# Patient Record
Sex: MALE | Race: WHITE | NOT HISPANIC OR LATINO | Employment: FULL TIME | URBAN - METROPOLITAN AREA
[De-identification: names, ages, dates, MRNs, and addresses within clinical notes are randomized per-mention and may not be internally consistent; named-entity substitution may affect disease eponyms.]

---

## 2017-02-13 ENCOUNTER — RECORDS - HEALTHEAST (OUTPATIENT)
Dept: LAB | Facility: CLINIC | Age: 62
End: 2017-02-13

## 2017-02-13 LAB
CHOLEST SERPL-MCNC: 174 MG/DL
FASTING STATUS PATIENT QL REPORTED: NO
HCV AB SERPL QL IA: NEGATIVE
HDLC SERPL-MCNC: 55 MG/DL
LDLC SERPL CALC-MCNC: 103 MG/DL
PSA SERPL-MCNC: 1.1 NG/ML (ref 0–4.5)
TRIGL SERPL-MCNC: 78 MG/DL

## 2018-02-20 ENCOUNTER — RECORDS - HEALTHEAST (OUTPATIENT)
Dept: LAB | Facility: CLINIC | Age: 63
End: 2018-02-20

## 2018-02-20 LAB
CHOLEST SERPL-MCNC: 164 MG/DL
FASTING STATUS PATIENT QL REPORTED: NO
HDLC SERPL-MCNC: 56 MG/DL
LDLC SERPL CALC-MCNC: 90 MG/DL
PSA SERPL-MCNC: 1.8 NG/ML (ref 0–4.5)
TRIGL SERPL-MCNC: 92 MG/DL

## 2019-02-22 ENCOUNTER — RECORDS - HEALTHEAST (OUTPATIENT)
Dept: LAB | Facility: CLINIC | Age: 64
End: 2019-02-22

## 2019-02-22 LAB — PSA SERPL-MCNC: 1.1 NG/ML (ref 0–4.5)

## 2023-03-31 ENCOUNTER — HOSPITAL ENCOUNTER (OUTPATIENT)
Dept: MRI IMAGING | Facility: HOSPITAL | Age: 68
Discharge: HOME | End: 2023-03-31
Attending: FAMILY MEDICINE | Admitting: FAMILY MEDICINE
Payer: COMMERCIAL

## 2023-03-31 DIAGNOSIS — M25.561 RIGHT KNEE PAIN: ICD-10-CM

## 2023-03-31 PROCEDURE — 73721 MRI JNT OF LWR EXTRE W/O DYE: CPT | Mod: RT

## 2024-04-30 ENCOUNTER — TRANSFERRED RECORDS (OUTPATIENT)
Dept: HEALTH INFORMATION MANAGEMENT | Facility: CLINIC | Age: 69
End: 2024-04-30

## 2024-08-21 ENCOUNTER — TRANSFERRED RECORDS (OUTPATIENT)
Dept: HEALTH INFORMATION MANAGEMENT | Facility: CLINIC | Age: 69
End: 2024-08-21

## 2024-10-16 ENCOUNTER — HOSPITAL ENCOUNTER (EMERGENCY)
Facility: HOSPITAL | Age: 69
Discharge: HOME | End: 2024-10-16
Admitting: STUDENT IN AN ORGANIZED HEALTH CARE EDUCATION/TRAINING PROGRAM
Payer: COMMERCIAL

## 2024-10-16 ENCOUNTER — APPOINTMENT (OUTPATIENT)
Dept: RADIOLOGY | Facility: HOSPITAL | Age: 69
End: 2024-10-16
Attending: PHYSICIAN ASSISTANT
Payer: COMMERCIAL

## 2024-10-16 VITALS
WEIGHT: 178 LBS | RESPIRATION RATE: 23 BRPM | OXYGEN SATURATION: 97 % | DIASTOLIC BLOOD PRESSURE: 81 MMHG | TEMPERATURE: 98 F | HEART RATE: 94 BPM | SYSTOLIC BLOOD PRESSURE: 133 MMHG

## 2024-10-16 DIAGNOSIS — I48.91 ATRIAL FIBRILLATION WITH RAPID VENTRICULAR RESPONSE (H): ICD-10-CM

## 2024-10-16 LAB
ALBUMIN SERPL BCG-MCNC: 4.5 G/DL (ref 3.5–5.2)
ALP SERPL-CCNC: 143 U/L (ref 40–150)
ALT SERPL W P-5'-P-CCNC: 28 U/L (ref 0–70)
ANION GAP SERPL CALCULATED.3IONS-SCNC: 10 MMOL/L (ref 7–15)
AST SERPL W P-5'-P-CCNC: 30 U/L (ref 0–45)
BASOPHILS # BLD AUTO: 0 10E3/UL (ref 0–0.2)
BASOPHILS NFR BLD AUTO: 0 %
BILIRUB SERPL-MCNC: 0.7 MG/DL
BUN SERPL-MCNC: 5.5 MG/DL (ref 8–23)
CALCIUM SERPL-MCNC: 9.6 MG/DL (ref 8.8–10.4)
CHLORIDE SERPL-SCNC: 106 MMOL/L (ref 98–107)
CREAT SERPL-MCNC: 0.84 MG/DL (ref 0.67–1.17)
EGFRCR SERPLBLD CKD-EPI 2021: >90 ML/MIN/1.73M2
EOSINOPHIL # BLD AUTO: 0.1 10E3/UL (ref 0–0.7)
EOSINOPHIL NFR BLD AUTO: 3 %
ERYTHROCYTE [DISTWIDTH] IN BLOOD BY AUTOMATED COUNT: 12.1 % (ref 10–15)
GLUCOSE SERPL-MCNC: 92 MG/DL (ref 70–99)
HCO3 SERPL-SCNC: 26 MMOL/L (ref 22–29)
HCT VFR BLD AUTO: 49.2 % (ref 40–53)
HGB BLD-MCNC: 16.7 G/DL (ref 13.3–17.7)
IMM GRANULOCYTES # BLD: 0 10E3/UL
IMM GRANULOCYTES NFR BLD: 0 %
LYMPHOCYTES # BLD AUTO: 1.7 10E3/UL (ref 0.8–5.3)
LYMPHOCYTES NFR BLD AUTO: 32 %
MAGNESIUM SERPL-MCNC: 2.3 MG/DL (ref 1.7–2.3)
MCH RBC QN AUTO: 31.9 PG (ref 26.5–33)
MCHC RBC AUTO-ENTMCNC: 33.9 G/DL (ref 31.5–36.5)
MCV RBC AUTO: 94 FL (ref 78–100)
MONOCYTES # BLD AUTO: 0.4 10E3/UL (ref 0–1.3)
MONOCYTES NFR BLD AUTO: 8 %
NEUTROPHILS # BLD AUTO: 2.9 10E3/UL (ref 1.6–8.3)
NEUTROPHILS NFR BLD AUTO: 56 %
NRBC # BLD AUTO: 0 10E3/UL
NRBC BLD AUTO-RTO: 0 /100
NT-PROBNP SERPL-MCNC: 329 PG/ML (ref 0–900)
PLATELET # BLD AUTO: 194 10E3/UL (ref 150–450)
POTASSIUM SERPL-SCNC: 4.8 MMOL/L (ref 3.4–5.3)
PROT SERPL-MCNC: 7.9 G/DL (ref 6.4–8.3)
RBC # BLD AUTO: 5.23 10E6/UL (ref 4.4–5.9)
SODIUM SERPL-SCNC: 142 MMOL/L (ref 135–145)
TROPONIN T SERPL HS-MCNC: 10 NG/L
TROPONIN T SERPL HS-MCNC: 9 NG/L
TSH SERPL DL<=0.005 MIU/L-ACNC: 1.35 UIU/ML (ref 0.3–4.2)
WBC # BLD AUTO: 5.2 10E3/UL (ref 4–11)

## 2024-10-16 PROCEDURE — 84443 ASSAY THYROID STIM HORMONE: CPT | Performed by: PHYSICIAN ASSISTANT

## 2024-10-16 PROCEDURE — 71046 X-RAY EXAM CHEST 2 VIEWS: CPT

## 2024-10-16 PROCEDURE — 82947 ASSAY GLUCOSE BLOOD QUANT: CPT | Performed by: PHYSICIAN ASSISTANT

## 2024-10-16 PROCEDURE — 85025 COMPLETE CBC W/AUTO DIFF WBC: CPT | Performed by: PHYSICIAN ASSISTANT

## 2024-10-16 PROCEDURE — 83735 ASSAY OF MAGNESIUM: CPT | Performed by: PHYSICIAN ASSISTANT

## 2024-10-16 PROCEDURE — 84484 ASSAY OF TROPONIN QUANT: CPT | Performed by: PHYSICIAN ASSISTANT

## 2024-10-16 PROCEDURE — 36415 COLL VENOUS BLD VENIPUNCTURE: CPT | Performed by: PHYSICIAN ASSISTANT

## 2024-10-16 PROCEDURE — 93005 ELECTROCARDIOGRAM TRACING: CPT | Performed by: PHYSICIAN ASSISTANT

## 2024-10-16 PROCEDURE — 99285 EMERGENCY DEPT VISIT HI MDM: CPT | Mod: 25

## 2024-10-16 PROCEDURE — 83880 ASSAY OF NATRIURETIC PEPTIDE: CPT | Performed by: PHYSICIAN ASSISTANT

## 2024-10-16 RX ORDER — METOPROLOL SUCCINATE 25 MG/1
25 TABLET, EXTENDED RELEASE ORAL DAILY
Qty: 30 TABLET | Refills: 0 | Status: SHIPPED | OUTPATIENT
Start: 2024-10-16 | End: 2024-11-15

## 2024-10-16 ASSESSMENT — ACTIVITIES OF DAILY LIVING (ADL)
ADLS_ACUITY_SCORE: 35

## 2024-10-16 ASSESSMENT — COLUMBIA-SUICIDE SEVERITY RATING SCALE - C-SSRS
6. HAVE YOU EVER DONE ANYTHING, STARTED TO DO ANYTHING, OR PREPARED TO DO ANYTHING TO END YOUR LIFE?: NO
1. IN THE PAST MONTH, HAVE YOU WISHED YOU WERE DEAD OR WISHED YOU COULD GO TO SLEEP AND NOT WAKE UP?: NO
2. HAVE YOU ACTUALLY HAD ANY THOUGHTS OF KILLING YOURSELF IN THE PAST MONTH?: NO

## 2024-10-16 NOTE — ED PROVIDER NOTES
EMERGENCY DEPARTMENT ENCOUNTER   NAME: Jayden Maciel ; AGE: 69 year old male ; YOB: 1955 ; MRN: [id number] ; PCP: Roshan Flores     Evaluation Date & Time: No admission date for patient encounter.    ED Provider: Laquita Selby PA-C    CHIEF COMPLAINT     Atrial Fib      FINAL ASSESSMENT       ICD-10-CM    1. Atrial fibrillation with rapid ventricular response (H)  I48.91 [facility]  Referral          ED COURSE, MEDICAL DECISION MAKING, PLAN     ED course     12:46 PM Initial visit with patient and son. Plan for line, labs, imaging.   2:41 PM Rechecked and updated patient. Waiting for delta troponin and xray.   4:16 PM Rechecked and updated patient. Discharge instructions discussed. RN to follow.   ______________________________________________________________________    Reason for visit: Jayden Maciel is a 69 year old male with no pertinent PMH presenting for incidental find of atrial fibrillation today before his colonoscopy.  Reports having some shortness of breath when he tries to run long distance and occasionally gets lightheaded, but otherwise has been feeling great.    Exam positives: Irregularly irregular heart rhythm auscultated.  Scant pitting edema to bilateral ankles.  The rest of the exam is benign.  Heart rate varies from the 70s to the 130s.  He has otherwise been vitally normal.    Labs: Laboratory workup is largely reassuring.     EKG: Atrial fibrillation with RVR with a rate of 107.  Premature ventricular or aberrantly conducted complexes present.  No emergent ischemia or STEMI.    Imaging: Chest x-ray was obtained and independently interpreted by myself.  I am unable to appreciate any acute infiltrate or effusion.  Radiologist read: A strand of fibrosis and/or linear atelectasis in each lung base is stable. Lungs are otherwise clear. No pleural effusion. Heart size and pulmonary vascularity within normal limits     Consults:  /    Interventions/recheck: /    Assessment: Atrial fibrillation with RVR. Rates have been sitting in the low 100s for much of his time here. Pt is asymptomatic. Reports symptoms of shortness of breath and lightheadedness only with running long distances or walking up a steep hill. Not present with normal everyday activities.   [id number] score of 1 point putting him in the low-moderate risk group and anticoagulation should be considered.   Discussed case with Dr. Vegas who felt that starting immediate anticoagulation here was not imperative as long as he sees cardiology in the next 1-2 weeks.   Will start Metoprolol for rate control.   Had a conversation with patient regarding this and he is comfortable with this plan.     Acutely serious/life threatening considerations:   Unstable atrial fibrillation: pt asymptomatic. Rate not severely elevated. No elevated trop.   ACS/carditis: EKG and troponin reassuring to rule out ACS.   Aortic dissection: No chest pain or back pain. No syncope. Vitals reassuring.   CHF: BNP WNL, no effusions on CXR, no significant LE edema.       Overall, no red flag s/s present to suggest an acutely serious or life threatening condition that would necessitate hospitalization.     Plan: Rx for Metoprolol succinate 25mg. Rapid access referral provided.   Strict return precautions reviewed.  Patient/parent/guardian understanding and agreeable with the plan and will discharge to home in good condition.       *All pertinent lab & imaging studies independently reviewed. (See chart for details)   *Discussed the results of all the tests and plan with patient and family/guardians.       HISTORY OF PRESENT ILLNESS   Patient information was obtained from: Patient   Use of Intrepreter: None     Pertinent past medical history: none     Jayden Maciel is here by means of walk in  with family for evaluation of an incidental finding of atrial fibrillation.    Patient was scheduled to have his  colonoscopy today.  Completed his prep and showed up for the appointment, but they found him to be in atrial fibrillation.  They aborted the procedure and told him to come to the ER for evaluation.    Patient tells me that he has had some exertional shortness of breath since August after catching COVID.  States that he really notices it when he tries to run long distance (which he regularly does).  He has noticed that he needs to walk more often during his runs than what he normally does.  He does get a little lightheaded sometimes during these long distance runs as well.    No significant shortness of breath with everyday activities.  No dizziness or lightheadedness with everyday activities.  He has not noticed any significant fatigue.  Denies chest pain or palpitations.  Denies any lower extremity swelling.    Denies a history of atrial fibrillation, other cardiac conditions, or any other medical conditions in general.  Patient tells me that he is very healthy and does not take any daily medications for anything.    No other concerns.      MEDICAL HISTORY     No past medical history on file.    No past surgical history on file.    No family history on file.         metoprolol succinate ER (TOPROL XL) 25 MG 24 hr tablet          PHYSICAL EXAM     First Vitals:  Patient Vitals for the past 24 hrs:   BP Temp Pulse Resp SpO2 Weight   10/16/24 1615 133/81 -- 94 23 97 % --   10/16/24 1600 129/89 -- 98 29 97 % --   10/16/24 1545 124/83 -- 103 17 97 % --   10/16/24 1530 128/74 -- 106 24 97 % --   10/16/24 1516 -- -- -- -- 96 % --   10/16/24 1515 130/88 -- 107 -- 97 % --   10/16/24 1503 -- -- 106 19 97 % --   10/16/24 1500 126/86 -- (!) 133 11 -- --   10/16/24 1147 119/85 98  F (36.7  C) 79 14 98 % 80.7 kg (178 lb)       PHYSICAL EXAM:   Constitutional: No acute distress.  Neuro: Awake and alert. No focal deficits.  Psych: Calm and cooperative.  Eyes: PERRL. EOMI. Conjunctivae clear.    Mouth: Pink and moist.    Neck:  FROM.    Cardio: Irregularly irregular rhythm on auscultation.  Variable heart rate.  Initially 79, but does go as high as 133 at 1 point during the visit.  Adequate perfusion to extremities. No murmurs.  Pulmonary: Oxygenating well on RA. No labored breathing. CTA b/l.   Upper extremities: Moves freely.    Lower extremities: Scant pitting edema to bilateral ankles. Moves freely.   Skin: Natural color, warm, dry, intact.       RESULTS     LAB:  All pertinent labs reviewed and interpreted  Labs Ordered and Resulted from Time of ED Arrival to Time of ED Departure   COMPREHENSIVE METABOLIC PANEL - Abnormal       Result Value    Sodium 142      Potassium 4.8      Carbon Dioxide (CO2) 26      Anion Gap 10      Urea Nitrogen 5.5 (*)     Creatinine 0.84      GFR Estimate >90      Calcium 9.6      Chloride 106      Glucose 92      Alkaline Phosphatase 143      AST 30      ALT 28      Protein Total 7.9      Albumin 4.5      Bilirubin Total 0.7     TROPONIN T, HIGH SENSITIVITY - Normal    Troponin T, High Sensitivity 10     MAGNESIUM - Normal    Magnesium 2.3     TSH WITH FREE T4 REFLEX - Normal    TSH 1.35     NT PROBNP INPATIENT - Normal    N terminal Pro BNP Inpatient 329     TROPONIN T, HIGH SENSITIVITY - Normal    Troponin T, High Sensitivity 9     CBC WITH PLATELETS AND DIFFERENTIAL    WBC Count 5.2      RBC Count 5.23      Hemoglobin 16.7      Hematocrit 49.2      MCV 94      MCH 31.9      MCHC 33.9      RDW 12.1      Platelet Count 194      % Neutrophils 56      % Lymphocytes 32      % Monocytes 8      % Eosinophils 3      % Basophils 0      % Immature Granulocytes 0      NRBCs per 100 WBC 0      Absolute Neutrophils 2.9      Absolute Lymphocytes 1.7      Absolute Monocytes 0.4      Absolute Eosinophils 0.1      Absolute Basophils 0.0      Absolute Immature Granulocytes 0.0      Absolute NRBCs 0.0         RADIOLOGY:  Chest XR,  PA & LAT   Final Result   IMPRESSION: A strand of fibrosis and/or linear atelectasis in  each lung base is stable. Lungs are otherwise clear. No pleural effusion. Heart size and pulmonary vascularity within normal limits.          ECG:  EKG was collected and independently interpreted by myself and also confirmed by MD.  Findings: Atrial fibrillation with rapid ventricular response with premature ventricular or apparently conducted complexes.  Rate of 107.  No interval prolongation.  No emergent ischemia or STEMI.  Comparisons: None      PROCEDURES     None         FINAL IMPRESSION:    ICD-10-CM    1. Atrial fibrillation with rapid ventricular response (H)  I48.91 [facility]  Referral          MEDICATIONS GIVEN IN THE EMERGENCY DEPARTMENT:  Medications - No data to display    NEW PRESCRIPTIONS STARTED AT TODAY'S ED VISIT:  New Prescriptions    METOPROLOL SUCCINATE ER (TOPROL XL) 25 MG 24 HR TABLET    Take 1 tablet (25 mg) by mouth daily.            MEDICAL DECISION MAKING:  Medical Decision Making  Obtained supplemental history:Supplemental history obtained?: No  Reviewed external records: External records reviewed?: No  Care impacted by chronic illness:Documented in Chart  Did you consider but not order tests?: Work up considered but not performed and documented in chart, if applicable  Did you interpret images independently?: Independent interpretation of ECG and images noted in documentation, when applicable.  Consultation discussion with other provider:Did you involve another provider (consultant, , pharmacy, etc.)?: No  Discharge. I prescribed additional prescription strength medication(s) as charted. See documentation for any additional details.    MIPS: Not Applicable      CRITICAL CARE:  N/A           Some or all of this documentation has been completed using dictation software and mild grammatical errors may be present. Please contact me with any concerns regarding this.       Laquita Selby PA-C  Emergency Medicine   [facility] EMERGENCY  DEPARTMENT       Laquita Selby PA-C  10/16/24 [id number]

## 2024-10-16 NOTE — DISCHARGE INSTRUCTIONS
You were seen for Atrial Fibrillation. Your heart rate has also been high because of this irregular rhythm. I am starting you on a medication to help slow your heart rate down some. It is called Metoprolol. Take this once daily.     I have provided you with a referral to see a cardiologist [facility]. You are considered a low-moderate risk of developing a stroke based on your age and past medical history. With this being said, you may benefit from an anticoagulant (aka blood thinner) medication. This will be something you and the cardiologist can discuss as there are both benefits and risks.     For any new or worsening symptoms do not hesitate to return to the ER.

## 2024-10-16 NOTE — ED NOTES
"Expected Patient Referral to ED  11:31 AM    Referring Clinic/Provider:  [facility]    Reason for referral/Clinical facts:  Did colonoscopy prep and then went to appointment, on EKG was found to be in afib, , don't know how long patient was in afib, \"this is new to him so we just wanted him to get checked out\", coming by car.    Recommendations provided:  Send to ED for further evaluation    Caller was informed that this institution does possess the capabilities and/or resources to provide for patient and should be transferred to our facility.    Discussed that if direct admit is sought and any hurdles are encountered, this ED would be happy to see the patient and evaluate.    Informed caller that recommendations provided are recommendations based only on the facts provided and that they responsible to accept or reject the advice, or to seek a formal in person consultation as needed and that this ED will see/treat patient should they arrive.      Mireille Mukherjee MD  [facility] EMERGENCY DEPARTMENT  [address] [place] [id number] [phone]       Mireille Mukherjee MD  10/16/24 1132    "

## 2024-10-16 NOTE — ED TRIAGE NOTES
Pt was in for routine colonoscopy today and per report in afib. No hx of. SOB and fatigue since COVID in August however no CP or feeling of heart racing or palpitations. Not on thinners     Triage Assessment (Adult)       Row Name 10/16/24 1147          Triage Assessment    Airway WDL WDL        Respiratory WDL    Respiratory WDL WDL        Skin Circulation/Temperature WDL    Skin Circulation/Temperature WDL WDL        Cardiac WDL    Cardiac WDL WDL        Peripheral/Neurovascular WDL    Peripheral Neurovascular WDL WDL        Cognitive/Neuro/Behavioral WDL    Cognitive/Neuro/Behavioral WDL WDL

## 2024-10-19 LAB
ATRIAL RATE - MUSE: 67 BPM
DIASTOLIC BLOOD PRESSURE - MUSE: NORMAL MMHG
INTERPRETATION ECG - MUSE: NORMAL
P AXIS - MUSE: NORMAL DEGREES
PR INTERVAL - MUSE: NORMAL MS
QRS DURATION - MUSE: 88 MS
QT - MUSE: 342 MS
QTC - MUSE: 456 MS
R AXIS - MUSE: 58 DEGREES
SYSTOLIC BLOOD PRESSURE - MUSE: NORMAL MMHG
T AXIS - MUSE: 19 DEGREES
VENTRICULAR RATE- MUSE: 107 BPM

## 2024-10-23 ENCOUNTER — OFFICE VISIT (OUTPATIENT)
Dept: CARDIOLOGY | Facility: CLINIC | Age: 69
End: 2024-10-23
Attending: PHYSICIAN ASSISTANT
Payer: COMMERCIAL

## 2024-10-23 VITALS
DIASTOLIC BLOOD PRESSURE: 62 MMHG | WEIGHT: 177 LBS | RESPIRATION RATE: 16 BRPM | HEART RATE: 49 BPM | HEIGHT: 68 IN | SYSTOLIC BLOOD PRESSURE: 114 MMHG | BODY MASS INDEX: 26.83 KG/M2

## 2024-10-23 DIAGNOSIS — I48.19 PERSISTENT ATRIAL FIBRILLATION (H): Primary | ICD-10-CM

## 2024-10-23 DIAGNOSIS — I48.91 ATRIAL FIBRILLATION WITH RAPID VENTRICULAR RESPONSE (H): ICD-10-CM

## 2024-10-23 PROCEDURE — 99205 OFFICE O/P NEW HI 60 MIN: CPT | Performed by: INTERNAL MEDICINE

## 2024-10-23 NOTE — LETTER
10/23/2024    Roshan Flores MD  [facility] [address]  [facility] [facility] [address]    RE: Jayden Maciel       Dear Colleague,     I had the pleasure of seeing Jayden Maciel in the [facility].    CARDIOLOGY CLINIC CONSULT NOTE     Assessment/Plan:   1.  Atrial fibrillation with rapid ventricular response.  Slowed heart rate following initiation with metoprolol, no change in symptoms.  Duration of atrial fibrillation on yet clear, as he has been asymptomatic.  Will plan a trial of cardioversion to see if this improves his stamina which she has noticed to be decreased over the last couple of years.  We will also check an echocardiogram to look for evidence of structural heart disease.  Long-term with his [id number] score of 1 he may not need to be on anticoagulation, discussed that for the next couple of months at least we will continue Eliquis 5 mg twice daily.  Continue metoprolol succinate.     A-fib clinic follow-up in preparation for cardioversion in 3 to 4 weeks, follow-up with me 3 months     History of Present Illness:     It is my pleasure to see Jayden Maciel at the [facility] clinic for evaluation of new onset atrial fibrillation.    Jayden Maciel is a 69 year old male with a past medical history of good health, lifelong runner who contracted COVID-pneumonia in 2022 and noticed a significant decline in his activity tolerance after that time.  He did not check his pulse on any regular basis but resumed walking/jogging, at a slower pace and was able to complete two 5K runs this summer.  He developed a recurrent bout of COVID in August and noticed a decline in his activity tolerance since then.  Last week he was scheduled for screening colonoscopy and after completing the prep he was found to be in atrial fibrillation with a rapid ventricular response.  He was otherwise asymptomatic.  His colonoscopy was canceled, he was sent to the  "emergency room where he was started on metoprolol succinate 25 mg daily.  He did not notice any difference after starting the metoprolol, was able to run another 5K over the weekend with stable activity tolerance.    He has no history of hypertension, diabetes mellitus, stroke, vascular disease, heart failure.  This gives him a [id number] score of 1.  He reports he will have no problems remembering to take pills on a daily basis or even twice a day.    Past Medical History:   There is no problem list on file for this patient.      Past Surgical History:   No past surgical history on file.    Family History:   No family history on file.  Family history reviewed and is not pertinent to the chief complaint or presenting problem    Social History:    reports that he has never smoked. He has never used smokeless tobacco. He reports that he does not use drugs.    Exercise: Active runner/jogger, loss of stamina since 1/2022, still able to walk and jog, competed in 5K race over the weekend and walk 4 miles yesterday without a problem.  Does not track his heart rate    Sleep: Polysomnography in 2015 at a weight of 165 pounds showed mild obstructive sleep apnea.    Meds:     Current Outpatient Medications   Medication Sig Dispense Refill     metoprolol succinate ER (TOPROL XL) 25 MG 24 hr tablet Take 1 tablet (25 mg) by mouth daily. 30 tablet 0       Allergies:   Penicillins and Other environmental allergy    Review of Systems:     Positive for cough, shortness of breath with activity, irregular heartbeat, leg swelling.  12 point review of systems otherwise negative     Please refer above for cardiac ROS details.       Objective:      Physical Exam  80.3 kg (177 lb)  1.727 m (5' 8\")  Body mass index is 26.91 kg/m .  /62 (BP Location: Left arm, Patient Position: Sitting, Cuff Size: Adult Regular)   Pulse (!) 49   Resp 16   Ht 1.727 m (5' 8\")   Wt 80.3 kg (177 lb)   BMI 26.91 kg/m          General Appearance : " "Awake, Alert, No acute distress  HEENT: No Scleral icterus; the mucous membranes were pink and moist.  Conjunctivae not injected  Neck:  No cervical bruits, jugular venous distention, or thyromegaly   Chest: The spine was straight. Chest wall symmetric  Lungs: Respirations unlabored; the lungs are clear to auscultation.  No wheezing   Cardiovascular:   Normal point of maximal impulse.  Auscultation reveals irregular first and second heart sounds with no murmurs, rubs, or gallops.  Carotid, radial, and dorsalis pedal pulses are intact and symmetric.    Abdomen: No organomegaly, masses, bruits, or tenderness. Bowels sounds are present  Extremities: No edema  Skin: No xanthelasma. Warm, Dry.  Musculoskeletal: No tenderness.  Neurologic: Alert and oriented ×3. Speech is fluent.      EKG (personally reviewed):  10/16/2024: Atrial fibrillation with rapid ventricular response and aberrant conduction versus PVCs at 107 bpm.    Cardiac Imaging Studies:  EXAM: XR CHEST 2 VIEWS  LOCATION: [facility]  DATE: 10/16/2024  INDICATION: Atrial fibrillation incidentally found  COMPARISON: 3/27/2023                                                        IMPRESSION: A strand of fibrosis and/or linear atelectasis in each lung base is stable. Lungs are otherwise clear. No pleural effusion. Heart size and pulmonary vascularity within normal limits.     Lab Results    Chemistry/lipid CBC Cardiac Enzymes/BNP/TSH/INR   Recent Labs   Lab Test 02/20/18  0834   CHOL 164   HDL 56   LDL 90   TRIG 92     Recent Labs   Lab Test 02/20/18  0834 02/13/17  0956   LDL 90 103     Recent Labs   Lab Test 10/16/24  1332      POTASSIUM 4.8   CHLORIDE 106   CO2 26   GLC 92   BUN 5.5*   CR 0.84   GFRESTIMATED >90   LAMONTE 9.6     Recent Labs   Lab Test 10/16/24  1332   CR 0.84     No results for input(s): \"A1C\" in the last 69522 hours.       Recent Labs   Lab Test 10/16/24  1332   WBC 5.2   HGB 16.7   HCT 49.2   MCV 94    " "    Recent Labs   Lab Test 10/16/24  1332   HGB 16.7    No results for input(s): \"TROPONINI\" in the last 89377 hours.  Recent Labs   Lab Test 10/16/24  1332   NTBNPI 329     Recent Labs   Lab Test 10/16/24  1332   TSH 1.35     No results for input(s): \"INR\" in the last 45626 hours.       Rio Mobley MD, MD [facility]      This note created using Dragon voice recognition software. Sound alike errors may have escaped editing.        Thank you for allowing me to participate in the care of your patient.      Sincerely,     Rio Mobley MD     [facility]  cc:   Laquita Sleby PA-C  [address] [place] [address]      "

## 2024-10-23 NOTE — PATIENT INSTRUCTIONS
Continue metoprolol succinate 25 mg daily  Continue to stay active, if you are having troubles give us a call and we will need to change your therapy.  Start Eliquis 5 mg twice daily, continue this until you get the clearance to stop it after your cardioversion.  Return in 3 to 4 weeks to make sure you are taking your medications daily and plan cardioversion  We will schedule an echocardiogram to make sure there is no structural heart disease

## 2024-10-23 NOTE — PROGRESS NOTES
CARDIOLOGY CLINIC CONSULT NOTE     Assessment/Plan:   1.  Atrial fibrillation with rapid ventricular response.  Slowed heart rate following initiation with metoprolol, no change in symptoms.  Duration of atrial fibrillation on yet clear, as he has been asymptomatic.  Will plan a trial of cardioversion to see if this improves his stamina which she has noticed to be decreased over the last couple of years.  We will also check an echocardiogram to look for evidence of structural heart disease.  Long-term with his [id number] score of 1 he may not need to be on anticoagulation, discussed that for the next couple of months at least we will continue Eliquis 5 mg twice daily.  Continue metoprolol succinate.     A-fib clinic follow-up in preparation for cardioversion in 3 to 4 weeks, follow-up with me 3 months     History of Present Illness:     It is my pleasure to see Jayden Maciel at the [facility] clinic for evaluation of new onset atrial fibrillation.    Jayden Maciel is a 69 year old male with a past medical history of good health, lifelong runner who contracted COVID-pneumonia in 2022 and noticed a significant decline in his activity tolerance after that time.  He did not check his pulse on any regular basis but resumed walking/jogging, at a slower pace and was able to complete two 5K runs this summer.  He developed a recurrent bout of COVID in August and noticed a decline in his activity tolerance since then.  Last week he was scheduled for screening colonoscopy and after completing the prep he was found to be in atrial fibrillation with a rapid ventricular response.  He was otherwise asymptomatic.  His colonoscopy was canceled, he was sent to the emergency room where he was started on metoprolol succinate 25 mg daily.  He did not notice any difference after starting the metoprolol, was able to run another 5K over the weekend with stable activity tolerance.    He has no history of  "hypertension, diabetes mellitus, stroke, vascular disease, heart failure.  This gives him a QFI0FG2-AUFv score of 1.  He reports he will have no problems remembering to take pills on a daily basis or even twice a day.    Past Medical History:   There is no problem list on file for this patient.      Past Surgical History:   No past surgical history on file.    Family History:   No family history on file.  Family history reviewed and is not pertinent to the chief complaint or presenting problem    Social History:    reports that he has never smoked. He has never used smokeless tobacco. He reports that he does not use drugs.    Exercise: Active runner/jogger, loss of stamina since 1/2022, still able to walk and jog, competed in 5K race over the weekend and walk 4 miles yesterday without a problem.  Does not track his heart rate    Sleep: Polysomnography in 2015 at a weight of 165 pounds showed mild obstructive sleep apnea.    Meds:     Current Outpatient Medications   Medication Sig Dispense Refill    metoprolol succinate ER (TOPROL XL) 25 MG 24 hr tablet Take 1 tablet (25 mg) by mouth daily. 30 tablet 0       Allergies:   Penicillins and Other environmental allergy    Review of Systems:     Positive for cough, shortness of breath with activity, irregular heartbeat, leg swelling.  12 point review of systems otherwise negative     Please refer above for cardiac ROS details.       Objective:      Physical Exam  80.3 kg (177 lb)  1.727 m (5' 8\")  Body mass index is 26.91 kg/m .  /62 (BP Location: Left arm, Patient Position: Sitting, Cuff Size: Adult Regular)   Pulse (!) 49   Resp 16   Ht 1.727 m (5' 8\")   Wt 80.3 kg (177 lb)   BMI 26.91 kg/m          General Appearance : Awake, Alert, No acute distress  HEENT: No Scleral icterus; the mucous membranes were pink and moist.  Conjunctivae not injected  Neck:  No cervical bruits, jugular venous distention, or thyromegaly   Chest: The spine was straight. Chest wall " "symmetric  Lungs: Respirations unlabored; the lungs are clear to auscultation.  No wheezing   Cardiovascular:   Normal point of maximal impulse.  Auscultation reveals irregular first and second heart sounds with no murmurs, rubs, or gallops.  Carotid, radial, and dorsalis pedal pulses are intact and symmetric.    Abdomen: No organomegaly, masses, bruits, or tenderness. Bowels sounds are present  Extremities: No edema  Skin: No xanthelasma. Warm, Dry.  Musculoskeletal: No tenderness.  Neurologic: Alert and oriented ×3. Speech is fluent.      EKG (personally reviewed):  10/16/2024: Atrial fibrillation with rapid ventricular response and aberrant conduction versus PVCs at 107 bpm.    Cardiac Imaging Studies:  EXAM: XR CHEST 2 VIEWS  LOCATION: [facility]  DATE: 10/16/2024  INDICATION: Atrial fibrillation incidentally found  COMPARISON: 3/27/2023                                                        IMPRESSION: A strand of fibrosis and/or linear atelectasis in each lung base is stable. Lungs are otherwise clear. No pleural effusion. Heart size and pulmonary vascularity within normal limits.     Lab Results    Chemistry/lipid CBC Cardiac Enzymes/BNP/TSH/INR   Recent Labs   Lab Test 02/20/18  0834   CHOL 164   HDL 56   LDL 90   TRIG 92     Recent Labs   Lab Test 02/20/18  0834 02/13/17  0956   LDL 90 103     Recent Labs   Lab Test 10/16/24  1332      POTASSIUM 4.8   CHLORIDE 106   CO2 26   GLC 92   BUN 5.5*   CR 0.84   GFRESTIMATED >90   LAMONTE 9.6     Recent Labs   Lab Test 10/16/24  1332   CR 0.84     No results for input(s): \"A1C\" in the last 32368 hours.       Recent Labs   Lab Test 10/16/24  1332   WBC 5.2   HGB 16.7   HCT 49.2   MCV 94        Recent Labs   Lab Test 10/16/24  1332   HGB 16.7    No results for input(s): \"TROPONINI\" in the last 23749 hours.  Recent Labs   Lab Test 10/16/24  1332   NTBNPI 329     Recent Labs   Lab Test 10/16/24  1332   TSH 1.35     No results for " "input(s): \"INR\" in the last 57472 hours.       Rio Mobley MD, MD [facility]      This note created using Dragon voice recognition software. Sound alike errors may have escaped editing.    "

## 2024-11-20 ENCOUNTER — HOSPITAL ENCOUNTER (OUTPATIENT)
Dept: CARDIOLOGY | Facility: HOSPITAL | Age: 69
Discharge: HOME | End: 2024-11-20
Attending: INTERNAL MEDICINE
Payer: COMMERCIAL

## 2024-11-20 DIAGNOSIS — I48.19 PERSISTENT ATRIAL FIBRILLATION (H): ICD-10-CM

## 2024-11-20 DIAGNOSIS — I48.91 ATRIAL FIBRILLATION WITH RAPID VENTRICULAR RESPONSE (H): ICD-10-CM

## 2024-11-20 PROCEDURE — 93306 TTE W/DOPPLER COMPLETE: CPT | Mod: 26 | Performed by: INTERNAL MEDICINE

## 2024-11-20 PROCEDURE — 93306 TTE W/DOPPLER COMPLETE: CPT

## 2024-11-25 NOTE — PROGRESS NOTES
[facility]  Cardiac Electrophysiology  [address]  [place] [address]   Office: [phone]  Fax: [phone]     HEART CARE ELECTROPHYSIOLOGY NOTE    Primary care: Roshan Flores MD  Primary cardiologist: Dr. Mobley    REASON FOR VISIT: persistent atrial fibrillation      Assessment/Recommendations     Persistent atrial fibrillation/stage 3A: he was seen in clinic 10/23/24 by Dr. Mobley for atrial fibrillaiton with RVR. He was scheduled for preop for colonoscopy and was found to be in atrial fibrillation with RVR.  He was asymptomatic. He was on metoprolol and felt better as his exercise tolerance was increased. He was only given a one month supply of metoprolol and ran out last week. Since then, he has noticed his heart rate was higher and exercise tolerance was down. Overall, his decreased exercise tolerance started when he had COVID in 2022.     He does not monitor his pulse at home. His pulse today is irregular and around 100, but also states he was nervous this morning as he thought he was getting cardioversion today.     We discussed the pathophysiology and natural progression of atrial fibrillation, management including stroke risk reduction, rate control, antiarrhythmic drug therapy, and consideration of catheter ablation, with maintenance of sinus rhythm associated with reduced mortality, stroke, and heart failure- and acute coronary syndrome-related hospitalizations. Discussed catheter ablation procedure for atrial fibrillation as a long-term management strategy and superiority to medical therapy . He was given information to review at home. We also discussed long term management of atrial fibrillation includes sleep apnea diagnosis and management, avoiding heavy alcohol consumption, and management of concurrent hypertension and coronary artery disease as appropriate.    [id number] score of 1 for age    Plan:  Resume metoprolol 25 mg daily today - he had  symptomatic improvement. Can hold morning of cardioversion.   Continue eliquis 5 mg BID for stroke prophylaxis, no missed doses in the last 3 weeks, continue morning of cardioversion.  Cardioversion when able. May need antiarrythmic if cardioversion fails.    We discussed ablation as a long-term treatment strategy for atrial fibrillation, he is interested. Message was passed on to our RN team.        History of Present Illness/Subjective    Jayden Maciel is a 69 year old male who is seen today for evaluation of atrial fibrillation.  He was diagnosed incidentally at preop screening for colonoscopy.  He was found to be in A-fib with RVR, asymptomatic at that time.     He has definitely noticed a decrease in his exercise tolerance. While he was on metoprolol, he was able to walk further and at a faster pace. He ran out of metoprolol about a week ago, and has noticed he hasn't been able to walk as far, and has noticed his heart rate has been faster as well. He did note some lightheadedness with his most recent brisk walk in the evening.     At rest, he denies chest pain, SOB, dizziness/lightheadedness, palpitations/racing heart, syncope/pre-syncope, abdominal bloating, lower extremity edema         Data Review     Arrhythmia hx:  Sx: Asymptomatic, possible decreased exercise tolerance  Sx onset: Unclear  Dx/date: 10/16/2024  Rate control: Metoprolol succinate 25 mg  AAD: None  DCCV: None  Ablation: None  [id number] 1 for age  OAC: Eliquis 5 mg twice daily    EKG personally reviewed.   10/16/2024: Atrial fibrillation with  bpm    TTE 11/20/24:  1. The left ventricle is normal in size. Left ventricular systolic performance  is at the lower limits of normal. The ejection fraction is estimated to be  50%.  2. There is mild aortic valve sclerosis without significant stenosis.  3. Normal right ventricular size with mildly reduced right ventricular  systolic performance.  4. There is mild-moderate biatrial  enlargement.      I have reviewed and updated the patient's past medical history, allergy list and medication list.          Physical Examination Review of Systems   BMI= There is no height or weight on file to calculate BMI.    Wt Readings from Last 3 Encounters:   10/23/24 80.3 kg (177 lb)   10/16/24 80.7 kg (178 lb)       Vitals: There were no vitals taken for this visit.  General   Appearance:   Alert and oriented, in no acute distress.    HEENT:  Normocephalic and atraumatic. Conjunctiva and sclera are clear. Moist oral mucosa.    Neck: No JVP, carotid bruit or obvious thyromegaly.   Lungs:   Respirations unlabored. Clear bilaterally with no rales, rhonchi, or wheezes.     Cardiovascular:   Rhythm is irregular. S1 and S2 are normal. No significant murmur is present. Lower extremities demonstrate no significant edema. Posterior tibial pulses are intact bilaterally.   Extremities: No cyanosis or clubbing   Skin: Skin is warm, dry, and otherwise intact.   Neurologic: Gait not assessed. Mood and affect appropriate.     ROS: 10 point ROS neg other than the symptoms noted above in the HPI.      Medical History  Surgical History Family History Social History   No past medical history on file. No past surgical history on file. No family history on file. Social History     Tobacco Use    Smoking status: Never    Smokeless tobacco: Never   Substance Use Topics    Drug use: Never          Medications  Allergies   Current Outpatient Medications   Medication Sig Dispense Refill    apixaban ANTICOAGULANT (ELIQUIS) 5 MG tablet Take 1 tablet (5 mg) by mouth 2 times daily. 90 tablet 3    metoprolol succinate ER (TOPROL XL) 25 MG 24 hr tablet Take 1 tablet (25 mg) by mouth daily. 30 tablet 0    Allergies   Allergen Reactions    Penicillins Hives    Other Environmental Allergy Other (See Comments)     Hayfever Symptoms         Lab Results    Chemistry/lipid CBC Cardiac Enzymes/BNP/TSH/INR   Lab Results   Component Value Date     "BUN 5.5 (L) 10/16/2024     10/16/2024    CO2 26 10/16/2024     No results found for: \"CREATININE\"    Lab Results   Component Value Date    CHOL 164 2018    HDL 56 2018    LDL 90 2018      Lab Results   Component Value Date    WBC 5.2 10/16/2024    HGB 16.7 10/16/2024    HCT 49.2 10/16/2024    MCV 94 10/16/2024     10/16/2024    Lab Results   Component Value Date    TSH 1.35 10/16/2024        The longitudinal plan of care for the diagnosis(es)/condition(s) as documented were addressed during this visit. Due to the added complexity in care, I will continue to support Jayden in the subsequent management and with ongoing continuity of care.      This note has been dictated using voice recognition software. Any grammatical, typographical, or context distortions are unintentional and inherent to the software.    Ishmael Ramirez PA-C  Clinical Cardiac Electrophysiology  [facility]  Clinic and [phone]  Fax: [phone]  Electrophysiology Nurses: [phone]                                  "

## 2024-11-25 NOTE — H&P (VIEW-ONLY)
[facility]  Cardiac Electrophysiology  [address]  [place] [address]   Office: [phone]  Fax: [phone]     HEART CARE ELECTROPHYSIOLOGY NOTE    Primary care: Roshan Flores MD  Primary cardiologist: Dr. Mobley    REASON FOR VISIT: persistent atrial fibrillation      Assessment/Recommendations     Persistent atrial fibrillation/stage 3A: he was seen in clinic 10/23/24 by Dr. Mobley for atrial fibrillaiton with RVR. He was scheduled for preop for colonoscopy and was found to be in atrial fibrillation with RVR.  He was asymptomatic. He was on metoprolol and felt better as his exercise tolerance was increased. He was only given a one month supply of metoprolol and ran out last week. Since then, he has noticed his heart rate was higher and exercise tolerance was down. Overall, his decreased exercise tolerance started when he had COVID in 2022.     He does not monitor his pulse at home. His pulse today is irregular and around 100, but also states he was nervous this morning as he thought he was getting cardioversion today.     We discussed the pathophysiology and natural progression of atrial fibrillation, management including stroke risk reduction, rate control, antiarrhythmic drug therapy, and consideration of catheter ablation, with maintenance of sinus rhythm associated with reduced mortality, stroke, and heart failure- and acute coronary syndrome-related hospitalizations. Discussed catheter ablation procedure for atrial fibrillation as a long-term management strategy and superiority to medical therapy . He was given information to review at home. We also discussed long term management of atrial fibrillation includes sleep apnea diagnosis and management, avoiding heavy alcohol consumption, and management of concurrent hypertension and coronary artery disease as appropriate.    [id number] score of 1 for age    Plan:  Resume metoprolol 25 mg daily today - he had  symptomatic improvement. Can hold morning of cardioversion.   Continue eliquis 5 mg BID for stroke prophylaxis, no missed doses in the last 3 weeks, continue morning of cardioversion.  Cardioversion when able. May need antiarrythmic if cardioversion fails.    We discussed ablation as a long-term treatment strategy for atrial fibrillation, he is interested. Message was passed on to our RN team.        History of Present Illness/Subjective    Jayden Maciel is a 69 year old male who is seen today for evaluation of atrial fibrillation.  He was diagnosed incidentally at preop screening for colonoscopy.  He was found to be in A-fib with RVR, asymptomatic at that time.     He has definitely noticed a decrease in his exercise tolerance. While he was on metoprolol, he was able to walk further and at a faster pace. He ran out of metoprolol about a week ago, and has noticed he hasn't been able to walk as far, and has noticed his heart rate has been faster as well. He did note some lightheadedness with his most recent brisk walk in the evening.     At rest, he denies chest pain, SOB, dizziness/lightheadedness, palpitations/racing heart, syncope/pre-syncope, abdominal bloating, lower extremity edema         Data Review     Arrhythmia hx:  Sx: Asymptomatic, possible decreased exercise tolerance  Sx onset: Unclear  Dx/date: 10/16/2024  Rate control: Metoprolol succinate 25 mg  AAD: None  DCCV: None  Ablation: None  [id number] 1 for age  OAC: Eliquis 5 mg twice daily    EKG personally reviewed.   10/16/2024: Atrial fibrillation with  bpm    TTE 11/20/24:  1. The left ventricle is normal in size. Left ventricular systolic performance  is at the lower limits of normal. The ejection fraction is estimated to be  50%.  2. There is mild aortic valve sclerosis without significant stenosis.  3. Normal right ventricular size with mildly reduced right ventricular  systolic performance.  4. There is mild-moderate biatrial  enlargement.      I have reviewed and updated the patient's past medical history, allergy list and medication list.          Physical Examination Review of Systems   BMI= There is no height or weight on file to calculate BMI.    Wt Readings from Last 3 Encounters:   10/23/24 80.3 kg (177 lb)   10/16/24 80.7 kg (178 lb)       Vitals: There were no vitals taken for this visit.  General   Appearance:   Alert and oriented, in no acute distress.    HEENT:  Normocephalic and atraumatic. Conjunctiva and sclera are clear. Moist oral mucosa.    Neck: No JVP, carotid bruit or obvious thyromegaly.   Lungs:   Respirations unlabored. Clear bilaterally with no rales, rhonchi, or wheezes.     Cardiovascular:   Rhythm is irregular. S1 and S2 are normal. No significant murmur is present. Lower extremities demonstrate no significant edema. Posterior tibial pulses are intact bilaterally.   Extremities: No cyanosis or clubbing   Skin: Skin is warm, dry, and otherwise intact.   Neurologic: Gait not assessed. Mood and affect appropriate.     ROS: 10 point ROS neg other than the symptoms noted above in the HPI.      Medical History  Surgical History Family History Social History   No past medical history on file. No past surgical history on file. No family history on file. Social History     Tobacco Use    Smoking status: Never    Smokeless tobacco: Never   Substance Use Topics    Drug use: Never          Medications  Allergies   Current Outpatient Medications   Medication Sig Dispense Refill    apixaban ANTICOAGULANT (ELIQUIS) 5 MG tablet Take 1 tablet (5 mg) by mouth 2 times daily. 90 tablet 3    metoprolol succinate ER (TOPROL XL) 25 MG 24 hr tablet Take 1 tablet (25 mg) by mouth daily. 30 tablet 0    Allergies   Allergen Reactions    Penicillins Hives    Other Environmental Allergy Other (See Comments)     Hayfever Symptoms         Lab Results    Chemistry/lipid CBC Cardiac Enzymes/BNP/TSH/INR   Lab Results   Component Value Date     "BUN 5.5 (L) 10/16/2024     10/16/2024    CO2 26 10/16/2024     No results found for: \"CREATININE\"    Lab Results   Component Value Date    CHOL 164 2018    HDL 56 2018    LDL 90 2018      Lab Results   Component Value Date    WBC 5.2 10/16/2024    HGB 16.7 10/16/2024    HCT 49.2 10/16/2024    MCV 94 10/16/2024     10/16/2024    Lab Results   Component Value Date    TSH 1.35 10/16/2024        The longitudinal plan of care for the diagnosis(es)/condition(s) as documented were addressed during this visit. Due to the added complexity in care, I will continue to support Jayden in the subsequent management and with ongoing continuity of care.      This note has been dictated using voice recognition software. Any grammatical, typographical, or context distortions are unintentional and inherent to the software.    Ishmael Ramirez PA-C  Clinical Cardiac Electrophysiology  [facility]  Clinic and [phone]  Fax: [phone]  Electrophysiology Nurses: [phone]                                  "

## 2024-11-26 ENCOUNTER — OFFICE VISIT (OUTPATIENT)
Dept: CARDIOLOGY | Facility: CLINIC | Age: 69
End: 2024-11-26
Payer: MEDICARE

## 2024-11-26 ENCOUNTER — DOCUMENTATION ONLY (OUTPATIENT)
Dept: CARDIOLOGY | Facility: CLINIC | Age: 69
End: 2024-11-26

## 2024-11-26 ENCOUNTER — TELEPHONE (OUTPATIENT)
Dept: CARDIOLOGY | Facility: CLINIC | Age: 69
End: 2024-11-26

## 2024-11-26 ENCOUNTER — PREP FOR PROCEDURE (OUTPATIENT)
Dept: CARDIOLOGY | Facility: CLINIC | Age: 69
End: 2024-11-26

## 2024-11-26 VITALS
DIASTOLIC BLOOD PRESSURE: 84 MMHG | HEART RATE: 126 BPM | BODY MASS INDEX: 27.74 KG/M2 | HEIGHT: 68 IN | SYSTOLIC BLOOD PRESSURE: 126 MMHG | RESPIRATION RATE: 16 BRPM | WEIGHT: 183 LBS

## 2024-11-26 DIAGNOSIS — I48.19 PERSISTENT ATRIAL FIBRILLATION (H): Primary | ICD-10-CM

## 2024-11-26 DIAGNOSIS — I48.91 ATRIAL FIBRILLATION WITH RAPID VENTRICULAR RESPONSE (H): ICD-10-CM

## 2024-11-26 DIAGNOSIS — I48.19 PERSISTENT ATRIAL FIBRILLATION (H): ICD-10-CM

## 2024-11-26 PROCEDURE — 99204 OFFICE O/P NEW MOD 45 MIN: CPT

## 2024-11-26 PROCEDURE — G2211 COMPLEX E/M VISIT ADD ON: HCPCS

## 2024-11-26 RX ORDER — METOPROLOL SUCCINATE 25 MG/1
25 TABLET, EXTENDED RELEASE ORAL DAILY
Qty: 90 TABLET | Refills: 1 | Status: SHIPPED | OUTPATIENT
Start: 2024-11-26

## 2024-11-26 RX ORDER — LIDOCAINE 40 MG/G
CREAM TOPICAL
Status: CANCELLED | OUTPATIENT
Start: 2024-11-26

## 2024-11-26 NOTE — PROGRESS NOTES
AC: Eliquis NP Med Review: CHANGES- Hold Metoprolol AM of DCCV.     DM Meds: None  GLP-1:None       Jayden Maciel, 1955, [id number]  Home:[phone] (home) Cell:There is no such number on file (mobile).  Emergency Contact: OLYA MACIEL   PCP: Roshan Flores, [phone]    +++Important patient information for CSC/Cath Lab staff : None+++    [facility] EP Cath Lab Procedure Order   Procedure: Cardioversion for AF  Ordering Provider: Ishmael GOMEZ  Date Ordered and Prepped: 11/26/2024 Karo Barone RN  Anticipated Case Duration:  Standard  Scheduling Needs/Timeframe:  Urgent-Next available spot  Scheduling Contact: Please contact pt to schedule  Cardiology Follow Up Apt s/p: Standard- EP ANETA @ 4-6 wks or previously scheduled General Card apt  Current Device/Device Co Needed for Procedure: None NoneNone  Pre-Procedural Testing needed: None  Anesthesia:  General    [facility] EP Cath Lab Prep   H&P:  Compled by cardiology on 11/26/24 if scheduled within 30 days, pt to schedule with PMD if procedure outside of this timeframe  Pre-op Labs: K if pt taking diuretic medication or hx of low Potassium, Beta HcG if appropriate, and INR if on Warfarin will be ordered AM of procedure and Review of most recent labs, WEL for procedure  T&S Pre-Procedure Review: T&S is not required for DCCV/DFT Testing  Medical Records Pertinent for Procedure:  None  Iodinated Contrast Dye Allergies (Does not include Shellfish, Egg, and/or Iodine Allergy): NA  GLP-1 Protocol: If on Dulaglutide (Trulicity) (weekly)- Injection hold 7 days prior to procedure  , Exenatide extended release (Bydureon bcise) (weekly)- Injection hold 7 days prior to procedure, Exenatide (Byetta) (twice daily)- Oral Tablet hold day prior and morning of procedure and for Injection hold 7 days prior to procedure, Semaglutide (Ozempic) (weekly)- Injection and Oral hold 7 days prior to procedure, Liraglutide (Victoza, Saxenda) (daily)- Injection hold day prior and  morning of procedure    Allergies   Allergen Reactions    Penicillins Hives    Other Environmental Allergy Other (See Comments)     Hayfever Symptoms       Current Outpatient Medications:     apixaban ANTICOAGULANT (ELIQUIS) 5 MG tablet, Take 1 tablet (5 mg) by mouth 2 times daily., Disp: 90 tablet, Rfl: 3    metoprolol succinate ER (TOPROL XL) 25 MG 24 hr tablet, Take 1 tablet (25 mg) by mouth daily., Disp: 90 tablet, Rfl: 1    Documentation Date:11/26/2024 8:31 AM  Karo Barone RN

## 2024-11-26 NOTE — Clinical Note
Hi,   Can we set Jayden up for ablation? He's got new, persistent atrial fibrillation. Plan for cardioversion first. Let me know if you need anything else.   Thanks, Ishmael

## 2024-11-26 NOTE — LETTER
11/26/2024    Roshan Flores MD  [facility] [address]  [facility] [address]    RE: Jayden JOVANY Maciel       Dear Colleague,     I had the pleasure of seeing Jayden Maciel in the [facility].       [facility]  Cardiac Electrophysiology  [address]  [place] [address]   Office: [phone]  Fax: [phone]     HEART CARE ELECTROPHYSIOLOGY NOTE    Primary care: Roshan Flores MD  Primary cardiologist: Dr. Mobley    REASON FOR VISIT: persistent atrial fibrillation      Assessment/Recommendations     Persistent atrial fibrillation/stage 3A: he was seen in clinic 10/23/24 by Dr. Mobley for atrial fibrillaiton with RVR. He was scheduled for preop for colonoscopy and was found to be in atrial fibrillation with RVR.  He was asymptomatic. He was on metoprolol and felt better as his exercise tolerance was increased. He was only given a one month supply of metoprolol and ran out last week. Since then, he has noticed his heart rate was higher and exercise tolerance was down. Overall, his decreased exercise tolerance started when he had COVID in 2022.     He does not monitor his pulse at home. His pulse today is irregular and around 100, but also states he was nervous this morning as he thought he was getting cardioversion today.     We discussed the pathophysiology and natural progression of atrial fibrillation, management including stroke risk reduction, rate control, antiarrhythmic drug therapy, and consideration of catheter ablation, with maintenance of sinus rhythm associated with reduced mortality, stroke, and heart failure- and acute coronary syndrome-related hospitalizations. Discussed catheter ablation procedure for atrial fibrillation as a long-term management strategy and superiority to medical therapy . He was given information to review at home. We also discussed long term management of atrial fibrillation includes sleep  apnea diagnosis and management, avoiding heavy alcohol consumption, and management of concurrent hypertension and coronary artery disease as appropriate.    [id number] score of 1 for age    Plan:  Resume metoprolol 25 mg daily today - he had symptomatic improvement. Can hold morning of cardioversion.   Continue eliquis 5 mg BID for stroke prophylaxis, no missed doses in the last 3 weeks, continue morning of cardioversion.  Cardioversion when able. May need antiarrythmic if cardioversion fails.    We discussed ablation as a long-term treatment strategy for atrial fibrillation, he is interested. Message was passed on to our RN team.        History of Present Illness/Subjective    Jayden Macile is a 69 year old male who is seen today for evaluation of atrial fibrillation.  He was diagnosed incidentally at preop screening for colonoscopy.  He was found to be in A-fib with RVR, asymptomatic at that time.     He has definitely noticed a decrease in his exercise tolerance. While he was on metoprolol, he was able to walk further and at a faster pace. He ran out of metoprolol about a week ago, and has noticed he hasn't been able to walk as far, and has noticed his heart rate has been faster as well. He did note some lightheadedness with his most recent brisk walk in the evening.     At rest, he denies chest pain, SOB, dizziness/lightheadedness, palpitations/racing heart, syncope/pre-syncope, abdominal bloating, lower extremity edema         Data Review     Arrhythmia hx:  Sx: Asymptomatic, possible decreased exercise tolerance  Sx onset: Unclear  Dx/date: 10/16/2024  Rate control: Metoprolol succinate 25 mg  AAD: None  DCCV: None  Ablation: None  [id number] 1 for age  OAC: Eliquis 5 mg twice daily    EKG personally reviewed.   10/16/2024: Atrial fibrillation with  bpm    TTE 11/20/24:  1. The left ventricle is normal in size. Left ventricular systolic performance  is at the lower limits of normal. The  ejection fraction is estimated to be  50%.  2. There is mild aortic valve sclerosis without significant stenosis.  3. Normal right ventricular size with mildly reduced right ventricular  systolic performance.  4. There is mild-moderate biatrial enlargement.      I have reviewed and updated the patient's past medical history, allergy list and medication list.          Physical Examination Review of Systems   BMI= There is no height or weight on file to calculate BMI.    Wt Readings from Last 3 Encounters:   10/23/24 80.3 kg (177 lb)   10/16/24 80.7 kg (178 lb)       Vitals: There were no vitals taken for this visit.  General   Appearance:   Alert and oriented, in no acute distress.    HEENT:  Normocephalic and atraumatic. Conjunctiva and sclera are clear. Moist oral mucosa.    Neck: No JVP, carotid bruit or obvious thyromegaly.   Lungs:   Respirations unlabored. Clear bilaterally with no rales, rhonchi, or wheezes.     Cardiovascular:   Rhythm is irregular. S1 and S2 are normal. No significant murmur is present. Lower extremities demonstrate no significant edema. Posterior tibial pulses are intact bilaterally.   Extremities: No cyanosis or clubbing   Skin: Skin is warm, dry, and otherwise intact.   Neurologic: Gait not assessed. Mood and affect appropriate.     ROS: 10 point ROS neg other than the symptoms noted above in the HPI.      Medical History  Surgical History Family History Social History   No past medical history on file. No past surgical history on file. No family history on file. Social History     Tobacco Use     Smoking status: Never     Smokeless tobacco: Never   Substance Use Topics     Drug use: Never          Medications  Allergies   Current Outpatient Medications   Medication Sig Dispense Refill     apixaban ANTICOAGULANT (ELIQUIS) 5 MG tablet Take 1 tablet (5 mg) by mouth 2 times daily. 90 tablet 3     metoprolol succinate ER (TOPROL XL) 25 MG 24 hr tablet Take 1 tablet (25 mg) by mouth daily. 30  "tablet 0    Allergies   Allergen Reactions     Penicillins Hives     Other Environmental Allergy Other (See Comments)     Hayfever Symptoms         Lab Results    Chemistry/lipid CBC Cardiac Enzymes/BNP/TSH/INR   Lab Results   Component Value Date    BUN 5.5 (L) 10/16/2024     10/16/2024    CO2 26 10/16/2024     No results found for: \"CREATININE\"    Lab Results   Component Value Date    CHOL 164 2018    HDL 56 2018    LDL 90 2018      Lab Results   Component Value Date    WBC 5.2 10/16/2024    HGB 16.7 10/16/2024    HCT 49.2 10/16/2024    MCV 94 10/16/2024     10/16/2024    Lab Results   Component Value Date    TSH 1.35 10/16/2024        The longitudinal plan of care for the diagnosis(es)/condition(s) as documented were addressed during this visit. Due to the added complexity in care, I will continue to support Jayden in the subsequent management and with ongoing continuity of care.      This note has been dictated using voice recognition software. Any grammatical, typographical, or context distortions are unintentional and inherent to the software.    Ishmael Ramirez PA-C  Clinical Cardiac Electrophysiology  [facility]  Clinic and [phone]  Fax: [phone]  Electrophysiology Nurses: [phone]                                    Thank you for allowing me to participate in the care of your patient.      Sincerely,     Derik Ramirez PA-C     [facility]  cc:   Rio Mobley MD  [address] [place] [address]      "

## 2024-11-26 NOTE — PATIENT INSTRUCTIONS
[facility]  Cardiac Electrophysiology  [address]  [place] [address]   Office: [phone]  Fax: [phone]     Jayden Maciel,    It was a pleasure to see you today at the [facility].     You have atrial fibrillation. We reviewed physiology and management options including antiarrhythmic drug therapy, catheter ablation and lifestyle changes. Below is some additional information about atrial fibrillation.     My recommendations after this visit include:  - plan to restart metoprolol 25 mg daily   - continue eliquis 5 mg twice daily for stroke protection  - plan for cardioversion when able   - we discussed ablation today, continue to consider ablation as a more definitive treatment for your atrial fibrillation    Please do not hesitate to call with additional questions or concerns.     Ishmael Ramirez PA-C  Clinical Cardiac Electrophysiology  [facility]  Clinic and [phone]  Fax: [phone]  Electrophysiology Nurses: [phone]             ATRIAL FIBRILLATION: Patient Information       What is atrial fibrillation?  Atrial fibrillation (AF or A-fib) is a common heart rhythm problem (arrhythmia) occurring within the upper chambers of the heart (the atria).  In normal rhythm, the upper and lower chambers of the heart are electrically driven to contract in a coordinated sequence.  In atrial fibrillation, the atria lose their ability to contract because of rapid and chaotic electrical activity.  The lower chambers of the heart (the ventricles) continue to pump blood throughout the body, though with irregular and often faster rates due to the chaotic activity within the atria.            How do I know if I have atrial fibrillation?   Some people may feel their heart beating faster, harder, or irregularly while in atrial fibrillation.  Others may be lightheaded, fatigued, feel weak or tired or become more short of  breath especially with activities.  Some patients have no symptoms at all.  Atrial fibrillation may be found due to an irregular pulse or on an electrocardiogram (ECG). Atrial fibrillation can start and stop on its own, and episodes can last from seconds to several months.       How common is atrial fibrillation?   An estimated 3-6 million people in the [place] have atrial fibrillation.  Atrial fibrillation is a common heart rhythm problem for older persons, affecting as estimated 12-15% of people over the age of 65 years of age. Up to 20% of people age 80 or older have atrial fibrillation though this is probably higher in reality.      What causes atrial fibrillation?   Age is the most important risk factor for atrial fibrillation.  Atrial fibrillation is more common in people with other heart disease, high blood pressure, diabetes, obesity, sleep apnea and in people who regularly consume alcohol.  Surgery, lung disease, or thyroid problems can lead to atrial fibrillation.  Atrial fibrillation has multiple possible causes, and in most cases a single cause cannot be found.    Atrial fibrillation is a progressive condition, meaning it worsens over time. Atrial fibrillation usually starts with at an early stage, with short and infrequent episodes.  In later stages of disease, more frequent and longer lasting episodes of atrial fibrillation occur. Over time, episodes ultimately do not stop on their own. Generally, more enlargement and scarring within the upper chambers of the heart is observed as atrial fibrillation progresses from early to late-stage disease.     How is atrial fibrillation diagnosed and evaluated?    Because of its start-stop nature, atrial fibrillation can be challenging to diagnose.  Atrial fibrillation is most commonly diagnosed via cardiac rhythm recordings - either an ECG or wearable cardiac rhythm monitor.  For patients with pacemakers, defibrillators or implantable loop recorders, atrial  fibrillation may be recorded via these devices.  Recently, commercially available devices (eg. Apple Watch, Power2SME device, certain FitBit devices, others) can allow patients to take cardiac rhythm recordings which may document atrial fibrillation.  Once atrial fibrillation is diagnosed, additional testing is indicated, including blood tests and an echocardiogram.  The echocardiogram uses ultrasound to look at your heart to assess your cardiac function and evaluate for other heart disease.  Additional evaluation may include CT or MRI studies.     Is atrial fibrillation dangerous?   Atrial fibrillation is not usually a life-threatening arrhythmia.  The most serious consequences of atrial fibrillation including stroke and worsening of overall cardiac function.  While in atrial fibrillation, the upper cardiac chambers do not contract normally, resulting in slower blood flow and increased risk of clot formation.  If this blood clot becomes detached from the heart a stroke can occur.  Unfortunately, stroke can be the first sign of atrial fibrillation for some people.  With a stroke, you may notice abnormal sensation, weakness on one side of the body or face, changes in your vision or speech.  If you have any of these signs, you should call 911 and be evaluated in an emergency room as soon as possible.       How is atrial fibrillation treated?     Several treatment options exist for suppressing atrial fibrillation - however, it is not an easily managed abnormal rhythm. The first goal in managing atrial fibrillation is to minimize stroke risk. The second goal is to improve symptoms associated with atrial fibrillation.  Finally, in patients with reduced cardiac function, maintaining normal rhythm can help improve cardiac function.       Blood thinners are used to reduce the risk of stroke in patients with high estimated stroke risk related to atrial fibrillation. Blood thinners include medications like Eliquis, Xarelto,  Pradaxa, and warfarin. For patients at higher risk of bleeding related to blood thinner use, implantable devices including the Watchman device may be an option to reduce stroke risk without the need for long term blood thinner use.       Atrial fibrillation can be managed via two strategies: rate control and rhythm control.  In a rate control strategy, our goal is to control your heart rate, typically with medications (such as beta blockers or calcium channel blockers). You will likely go in and out of rhythm. In a rhythm control strategy, medications (antiarrhythmics) or catheter ablation is used to keep you in normal rhythm and slow disease progression. A procedure called a cardioversion, in which an electric shock is delivered through patches placed on the chest wall while under deep sedation, can be performed to temporarily restore normal cardiac rhythm, though does not address the chance of atrial fibrillation recurrence. Treatments are more effective for earlier rather than later stage atrial fibrillation.  Lifestyle changes (maintaining a healthy weight, aerobic exercise, diagnosing and treating sleep apnea, and minimizing alcohol intake) are important elements of atrial fibrillation rhythm control.      What is catheter ablation for atrial fibrillation?  Cardiac catheter ablation is a commonly performed, minimally invasive procedure performed by a cardiac electrophysiologist to treat many different cardiac rhythm abnormalities. During the procedure, long, thin, flexible tubes are advanced into the heart using the femoral vein. Thermal energy (either heating or cooling) is applied within the heart to stop abnormal electrical activity.  Atrial fibrillation ablation is performed under general anesthesia with a breathing tube. Procedures generally taking approximately 2-3 hours.  Patients are typically observed for 3-5 hours after the ablation, and in most cases can be discharged home the same day.  Atrial  fibrillation ablation is associated with better outcomes (including mortality, cardiovascular hospitalizations, and atrial arrhythmia recurrences) compared to antiarrhythmic drug therapy.  However, atrial fibrillation recurrences are not uncommon, and repeat catheter ablation may be offered.  Your electrophysiology team can review atrial fibrillation ablation, anticipated success rates, risks, and recovery expectations with you.     What are anti-arrhythmic medications?  Anti-arrhythmic medications are specialized drugs which alter cardiac electrical functioning to decrease arrhythmias. There are several anti-arrhythmic medications available, each with its own success rate and side effects. Commonly used medications include sotalol, flecainide, and amiodarone. Some anti-arrhythmic medications are less effective though safer to use, others are more effective but have serious potential toxicities.  Atrial fibrillation recurrences are common and may require dose adjustment or change in antiarrhythmic therapy. Over time medications may not work as well as when they were first started. Your electrophysiology team will carefully consider which medication would be the best and safest for your particular case.       Can I live a normal life?    The goal of atrial fibrillation management is for patients to live normal lives without being limited by symptoms related to atrial fibrillation.     Are any additional educational resources available?  There are a number of excellent atrial fibrillation education resources available to you online.  A few options you may wish to review include:  hrsonline.org/guide-atrial-fibrillation  afibmatters.org  getsmartaboutafib.com  stopaf.com     What comes next?    Consider your management options and let us know how we can help in your decision process.  Please take medications as they have been prescribed.  You should also get any tests that may have been ordered for you.       When to  Call Your Doctor or seek emergency care:  Call your doctor or seek emergency care if you have any significant changes with the following:  Weakness  Dizziness  Fainting  Fatigue  Shortness of breath  Chest pain with increased activity  If you are concerned that your heart rate is too fast or too slow  Bleeding that does not stop in 10 minutes  Coughing or throwing up blood  Bloody diarrhea or bleeding hemorrhoids  Dark-colored urine or black stool    Allergic reactions:  Rash  Itching  Swelling  Trouble breathing or swallowing        Please call the [facility] at [phone] if you have concerns about your symptoms, your medicines, or your follow-up appointments.

## 2024-11-26 NOTE — TELEPHONE ENCOUNTER
Pre-Procedure Education    Procedure: DCCV with Marita Farooq NP on 11/27/2024 with arrival time 8:30 am      PT IS ON ELIQUIS AND NO MISSED DOSES     Orders: Orderset for procedure verified signed/held    COVID: COVID policy- if pt develops COVID like symptoms prior to procedure, he/she would need to complete an at home with a rapid antigen COVID test 1-2 days prior to your procedure date. If COVID + pt is aware the procedure will need to be rescheduled, and to contact CV scheduling as soon as possible    Pre-Op H&P: Completed- Available in Epic    Education:    PT HAS A  FOR PROCEDURE THAT WILL STAY WITH HIM    PT WAS JUST IN OFFICE TODAY PROCEDURE LETTER SENT  PT DOES NOT USE MYCHART    PT INSTRUCTED TO HOLD ANY VITAMINS MINERALS CALCIUM IRON OR SUPPLEMENTS THE MORNING OF CV  PT INSTRUCTED NO GUM CHEWING CANDY OR MINTS THE MORNING OF CV  PT INSTRUCTED TO LEAVE JEWELRY AT  HOME  PT INSTRUCTED TO BATHE OR SHOWER BEFORE COMING IN  PT IS ON ELIQUIS  PT INSTRUCTED TO HOLD METOPROLOL THE MORNING  OF CV PER FRANCINE ESPINOSA CNP  PT HAS A POST CV FOLLOW UP WITH  ELVIS 1/17       Contact: Reviewed via phone with pt    Pre-Procedure Instruction: NPO after midnight pre procedure, Defined NPO with pt, Remove all jewelry and leave all valuables at home, Shower prior to arrival, Sedation plan/orders, Transportation requirements and arrangements post procedure, Post-procedure follow up process, Post-procedure restrictions/expectations, and Pre-procedure letter sent- letter tab  Risks:      Medication:   Instructions regarding anticoagulants: Eliquis- To continue anticoagulation uninterrupted through their procedure    Instructions regarding antiarrhythmic medication: Beta Blocker;  PT INSTRUCTED TO HOLD METOPROLOL THE MORNING OF CV    Instructions given to pt regarding diuretics medication: NA for DCCV    Instructions given to pt regarding DM/GLP-1 medication:   DM- None  GLP-1- None    Instructions for medication, other  than anticoagulants and antiarrhythmics listed above, given to pt: Take all medication AM of procedure with small sips of water     Important patient information for staff: None    11/26/2024 10:32 AM  Gladys An LPN

## 2024-11-27 ENCOUNTER — ANESTHESIA EVENT (OUTPATIENT)
Dept: CARDIOLOGY | Facility: HOSPITAL | Age: 69
End: 2024-11-27
Payer: COMMERCIAL

## 2024-11-27 ENCOUNTER — ANESTHESIA (OUTPATIENT)
Dept: CARDIOLOGY | Facility: HOSPITAL | Age: 69
End: 2024-11-27
Payer: COMMERCIAL

## 2024-11-27 ENCOUNTER — HOSPITAL ENCOUNTER (OUTPATIENT)
Dept: CARDIOLOGY | Facility: HOSPITAL | Age: 69
Discharge: HOME | End: 2024-11-27
Attending: INTERNAL MEDICINE | Admitting: INTERNAL MEDICINE
Payer: COMMERCIAL

## 2024-11-27 VITALS
OXYGEN SATURATION: 98 % | TEMPERATURE: 97.9 F | RESPIRATION RATE: 27 BRPM | DIASTOLIC BLOOD PRESSURE: 100 MMHG | SYSTOLIC BLOOD PRESSURE: 143 MMHG | HEART RATE: 80 BPM

## 2024-11-27 DIAGNOSIS — I48.19 PERSISTENT ATRIAL FIBRILLATION (H): ICD-10-CM

## 2024-11-27 LAB
ATRIAL RATE - MUSE: 80 BPM
DIASTOLIC BLOOD PRESSURE - MUSE: NORMAL MMHG
INTERPRETATION ECG - MUSE: NORMAL
P AXIS - MUSE: 75 DEGREES
PR INTERVAL - MUSE: 144 MS
QRS DURATION - MUSE: 84 MS
QT - MUSE: 376 MS
QTC - MUSE: 433 MS
R AXIS - MUSE: 67 DEGREES
SYSTOLIC BLOOD PRESSURE - MUSE: NORMAL MMHG
T AXIS - MUSE: 63 DEGREES
VENTRICULAR RATE- MUSE: 80 BPM

## 2024-11-27 PROCEDURE — 370N000017 HC ANESTHESIA TECHNICAL FEE, PER MIN

## 2024-11-27 PROCEDURE — 93005 ELECTROCARDIOGRAM TRACING: CPT

## 2024-11-27 PROCEDURE — 999N000054 HC STATISTIC EKG NON-CHARGEABLE

## 2024-11-27 PROCEDURE — 258N000003 HC RX IP 258 OP 636: Performed by: STUDENT IN AN ORGANIZED HEALTH CARE EDUCATION/TRAINING PROGRAM

## 2024-11-27 PROCEDURE — 250N000011 HC RX IP 250 OP 636: Performed by: STUDENT IN AN ORGANIZED HEALTH CARE EDUCATION/TRAINING PROGRAM

## 2024-11-27 PROCEDURE — 92960 CARDIOVERSION ELECTRIC EXT: CPT

## 2024-11-27 RX ORDER — PROPOFOL 10 MG/ML
INJECTION, EMULSION INTRAVENOUS PRN
Status: DISCONTINUED | OUTPATIENT
Start: 2024-11-27 | End: 2024-11-27

## 2024-11-27 RX ORDER — LIDOCAINE 40 MG/G
CREAM TOPICAL
Status: DISCONTINUED | OUTPATIENT
Start: 2024-11-27 | End: 2024-11-27 | Stop reason: HOSPADM

## 2024-11-27 RX ORDER — SODIUM CHLORIDE 9 MG/ML
INJECTION, SOLUTION INTRAVENOUS CONTINUOUS PRN
Status: DISCONTINUED | OUTPATIENT
Start: 2024-11-27 | End: 2024-11-27

## 2024-11-27 RX ADMIN — SODIUM CHLORIDE: 9 INJECTION, SOLUTION INTRAVENOUS at 10:29

## 2024-11-27 RX ADMIN — PROPOFOL 100 MG: 10 INJECTION, EMULSION INTRAVENOUS at 10:30

## 2024-11-27 ASSESSMENT — ACTIVITIES OF DAILY LIVING (ADL)
ADLS_ACUITY_SCORE: 41

## 2024-11-27 ASSESSMENT — ENCOUNTER SYMPTOMS: DYSRHYTHMIAS: 1

## 2024-11-27 NOTE — PROCEDURES
[facility]    Procedure: Cardioversion External    Date/Time: 11/27/2024 12:51 PM    Performed by: Marita Farooq APRN CNP  Authorized by: Derik Ramirez PA-C      UNIVERSAL PROTOCOL   Site Marked: NA  Prior Images Obtained and Reviewed:  Yes  Required items: Required blood products, implants, devices and special equipment available    Patient identity confirmed:  Verbally with patient, arm band, provided demographic data and hospital-assigned identification number  Patient was reevaluated immediately before administering moderate or deep sedation or anesthesia  Confirmation Checklist:  Procedure was appropriate and matched the consent or emergent situation, patient's identity using two indicators, relevant allergies and correct equipment/implants were available  Time out: Immediately prior to the procedure a time out was called    Universal Protocol: the Joint Commission Universal Protocol was followed       ANESTHESIA    Anesthesia was administered and monitored by anesthesiology.  See anesthesia documentation for details.    SEDATION  Patient Sedated: Yes    Vital signs: Vital signs monitored during sedation      PROCEDURE DETAILS  Cardioversion basis: elective  Pre-procedure rhythm: atrial flutter  Patient position: patient was placed in a supine position  Chest area: chest area exposed  Electrodes: pads  Electrodes placed: anterior-posterior  Number of attempts: 1    Details of Attempts:  At 1033 after administration of IV propofol by MDA and confirmation of adequate sedation he received a single synchronous shock at 100 J with prompt restoration of sinus rhythm  Post-procedure rhythm: normal sinus rhythm  Complications: no complications      PROCEDURE  Describe Procedure: History of persistent atrial fibrillation diagnosed incidentally during recent colonoscopy.  TTE shows LVEF of 50% with mild/moderate biatrial enlargement.  He has no overt awareness of arrhythmia though noted  his stamina was somewhat worse after he ran out of metoprolol.  Successful cardioversion today  Continue metoprolol 25 mg daily  Continue Eliquis 5 mg twice a day for stroke prophylaxis  Follow-up with Ishmael Ramirez as scheduled  Patient Tolerance:  Patient tolerated the procedure well with no immediate complications

## 2024-11-27 NOTE — INTERVAL H&P NOTE
"I have reviewed the surgical (or preoperative) H&P that is linked to this encounter, and examined the patient. There are no significant changes    Clinical Conditions Present on Arrival:  Clinically Significant Risk Factors Present on Admission                 # Drug Induced Coagulation Defect: home medication list includes an anticoagulant medication       # Overweight: Estimated body mass index is 27.83 kg/m  as calculated from the following:    Height as of 11/26/24: 1.727 m (5' 8\").    Weight as of 11/26/24: 83 kg (183 lb).       "

## 2024-11-27 NOTE — ANESTHESIA POSTPROCEDURE EVALUATION
Patient: Jayden Maciel    Procedure: * No procedures listed *  Cardioversion External    Anesthesia Type:  MAC    Note:  Disposition: Outpatient   Postop Pain Control: Uneventful            Sign Out: Well controlled pain   PONV: No   Neuro/Psych: Uneventful            Sign Out: Acceptable/Baseline neuro status   Airway/Respiratory: Uneventful            Sign Out: Acceptable/Baseline resp. status   CV/Hemodynamics: Uneventful            Sign Out: Acceptable CV status; No obvious hypovolemia; No obvious fluid overload   Other NRE: NONE   DID A NON-ROUTINE EVENT OCCUR? No           Last vitals:  Vitals:    11/27/24 1045 11/27/24 1100 11/27/24 1115   BP: 113/73 131/86 (!) 143/100   Pulse:  73 80   Resp: 13 25 27   Temp:      SpO2: 98%         Electronically Signed By: Berto Kilpatrick MD  November 27, 2024  12:17 PM

## 2024-11-27 NOTE — ANESTHESIA CARE TRANSFER NOTE
Patient: Jayden Maciel    Procedure: * No procedures listed *  Cardioversion External    Diagnosis: * No pre-op diagnosis entered *  Diagnosis Additional Information: No value filed.    Anesthesia Type:   MAC     Note:    Oropharynx: oropharynx clear of all foreign objects  Level of Consciousness: drowsy  Oxygen Supplementation: nasal cannula  Level of Supplemental Oxygen (L/min / FiO2): 4  Independent Airway: airway patency satisfactory and stable  Dentition: dentition unchanged  Vital Signs Stable: post-procedure vital signs reviewed and stable  Report to RN Given: handoff report given  Patient transferred to: [facility]        Vitals:  Vitals Value Taken Time   /76 11/27/24 1032   Temp     Pulse 71 11/27/24 1033   Resp 22 11/27/24 1033   SpO2 96 % 11/27/24 1033   Vitals shown include unfiled device data.    Electronically Signed By: VESNA Merritt CRNA  November 27, 2024  10:35 AM

## 2024-11-27 NOTE — CARE PLAN
Pt dwain'ed to home via wheelchair after cardioversion, is in NSR, denies any pain, son will be staying the night with him tonight.

## 2024-11-27 NOTE — ANESTHESIA PREPROCEDURE EVALUATION
"Anesthesia Pre-Procedure Evaluation    Patient: Jayden Maciel   MRN: [id number] : 1955        Procedure : * No procedures listed *  Cardioversion External       History reviewed. No pertinent past medical history.   History reviewed. No pertinent surgical history.   Allergies   Allergen Reactions    Penicillins Hives    Other Environmental Allergy Other (See Comments)     Hayfever Symptoms      Social History     Tobacco Use    Smoking status: Never    Smokeless tobacco: Never   Substance Use Topics    Alcohol use: Not on file      Wt Readings from Last 1 Encounters:   24 83 kg (183 lb)        Anesthesia Evaluation            ROS/MED HX  ENT/Pulmonary:  - neg pulmonary ROS     Neurologic:  - neg neurologic ROS     Cardiovascular:     (+)  - -   -  - -                        dysrhythmias, a-fib,             METS/Exercise Tolerance:     Hematologic:       Musculoskeletal:       GI/Hepatic:  - neg GI/hepatic ROS     Renal/Genitourinary:  - neg Renal ROS     Endo:  - neg endo ROS     Psychiatric/Substance Use:  - neg psychiatric ROS     Infectious Disease:  - neg infectious disease ROS     Malignancy:       Other:            Physical Exam    Airway        Mallampati: II       Respiratory Devices and Support         Dental           Cardiovascular          Rhythm and rate: irregular     Pulmonary   pulmonary exam normal                OUTSIDE LABS:  CBC:   Lab Results   Component Value Date    WBC 5.2 10/16/2024    HGB 16.7 10/16/2024    HCT 49.2 10/16/2024     10/16/2024     BMP:   Lab Results   Component Value Date     10/16/2024    POTASSIUM 4.8 10/16/2024    CHLORIDE 106 10/16/2024    CO2 26 10/16/2024    BUN 5.5 (L) 10/16/2024    CR 0.84 10/16/2024    GLC 92 10/16/2024     COAGS: No results found for: \"PTT\", \"INR\", \"FIBR\"  POC: No results found for: \"BGM\", \"HCG\", \"HCGS\"  HEPATIC:   Lab Results   Component Value Date    ALBUMIN 4.5 10/16/2024    PROTTOTAL 7.9 10/16/2024    ALT 28 " "10/16/2024    AST 30 10/16/2024    ALKPHOS 143 10/16/2024    BILITOTAL 0.7 10/16/2024     OTHER:   Lab Results   Component Value Date    LAMONTE 9.6 10/16/2024    MAG 2.3 10/16/2024    TSH 1.35 10/16/2024       Anesthesia Plan    ASA Status:  3       Anesthesia Type: MAC.   Induction: Intravenous.           Consents    Anesthesia Plan(s) and associated risks, benefits, and realistic alternatives discussed. Questions answered and patient/representative(s) expressed understanding.     - Discussed: Risks, Benefits and Alternatives for BOTH SEDATION and the PROCEDURE were discussed     - Discussed with:  Patient            Postoperative Care            Comments:               Berto Kilpatrick MD    I have reviewed the pertinent notes and labs in the chart from the past 30 days and (re)examined the patient.  Any updates or changes from those notes are reflected in this note.            # Drug Induced Coagulation Defect: home medication list includes an anticoagulant medication              # Overweight: Estimated body mass index is 27.83 kg/m  as calculated from the following:    Height as of 11/26/24: 1.727 m (5' 8\").    Weight as of 11/26/24: 83 kg (183 lb).             "

## 2024-11-27 NOTE — CARE PLAN
Pt admitted to [facility] for cardioversion, no missed doses of Eliquis, prepped and ready for procedure.

## 2025-01-27 ENCOUNTER — PREP FOR PROCEDURE (OUTPATIENT)
Dept: CARDIOLOGY | Facility: CLINIC | Age: 70
End: 2025-01-27
Payer: COMMERCIAL

## 2025-01-27 ENCOUNTER — DOCUMENTATION ONLY (OUTPATIENT)
Dept: CARDIOLOGY | Facility: CLINIC | Age: 70
End: 2025-01-27
Payer: COMMERCIAL

## 2025-01-27 DIAGNOSIS — I48.91 ATRIAL FIBRILLATION WITH RAPID VENTRICULAR RESPONSE (H): Primary | ICD-10-CM

## 2025-01-27 DIAGNOSIS — I48.19 PERSISTENT ATRIAL FIBRILLATION (H): Primary | ICD-10-CM

## 2025-01-27 DIAGNOSIS — I48.91 ATRIAL FIBRILLATION WITH RAPID VENTRICULAR RESPONSE (H): ICD-10-CM

## 2025-01-27 RX ORDER — LIDOCAINE 40 MG/G
CREAM TOPICAL
OUTPATIENT
Start: 2025-01-27

## 2025-01-27 RX ORDER — SODIUM CHLORIDE 9 MG/ML
100 INJECTION, SOLUTION INTRAVENOUS CONTINUOUS
OUTPATIENT
Start: 2025-01-27

## 2025-01-27 NOTE — PROGRESS NOTES
PVI  Order Case Req Y  Order Set Y Imaging Order None     AC Eliquis-CONTINUE   AAD Metoprolol-Hold 3 days prior   PPI/H2 Blocker Start Protonix 40mg Daily 3 days prior, 6wk post   Diuretics None   DM/GLP-1 DM Meds- None  GLP-1- None     Lakeisha Forrest MD  [facility] Ep Support [facility]  Dear team,       Please schedule Jayden for an AF ablation    General Anesthesia  CARTO mapping system  Hold Metoprolol 3 days prior to ablation  Continue anticoagulation  CBC, BMP  on day of procedure    Thanks  SHAHEEN Carpenter JOVANY Maciel, 1955, [id number]  Home:[phone] (home) Cell:There is no such number on file (mobile).  Emergency Contact: OLYA MACIEL   PCP: Roshan Flores, [phone]    Important patient information for CSC/Cath Lab staff : None    [facility] EP Cath Lab Procedure Order   Ablation Type:  Atrial Fibrillation (Persistent) and Atrial Flutter  Case Request: None  Ordering Provider: Dr Forrest  Date Ordered and Prepped: 1/27/2025 Karo Barone, ABDELRAHMAN    Scheduling Information:  Anticipated Case Duration:  Standard ( Case per day SA 2:1, DW 5:1, KA 3:1)   Scheduling Timeframe:  Next Available  Scheduling Restrictions: None  Scheduling Contact: Please contact pt to schedule, if you are unable to schedule date within the next 24 hours please contact pt to update on scheduling process  EP RN Follow Up Apt: Schedule EP RN PC visit 3-4 days s/p PVI  MD Preference: Scheduling with ordering provider  Current Device/Device Co Needed for Procedure: None NoneNone  Pre-Procedural Testing needed: None  Mapping System Required:  Carto (Dr Alonso and Dr Forrest)  ICE Needed:  Yes  Anesthesia:General Anesthesia    [facility] EP Cath Lab Prep   H&P:  Compled by cardiology on 1/24/25 if scheduled within 30 days, pt will need RN education and Labs apt within 3 days of procedure. If pts PVI scheduled outside of 30 days, pt to schedule H&P with EP ANETA, RN Teach, and Labs within 30 days of PVI  Pre-op Labs: CBC, BMP, Beta HcG if  appropriate, and INR if on Warfarin will be ordered AM of procedure, if not completed at pre-op H&P within 7 days of procedure.  T&S Pre-Procedure Review: Does not need for PVI procedures  Medical Records Pertinent for Procedure:   Echo 11/20/24  Iodinated Contrast Dye Allergies (Does not include Shellfish, Egg, and/or Iodine Allergy): NA  GLP-1 Protocol: If on Dulaglutide (Trulicity) (weekly)- Injection hold 7 days prior to procedure  , Exenatide extended release (Bydureon bcise) (weekly)- Injection hold 7 days prior to procedure, Exenatide (Byetta) (twice daily)- Oral Tablet hold day prior and morning of procedure and for Injection hold 7 days prior to procedure, Semaglutide (Ozempic) (weekly)- Injection and Oral hold 7 days prior to procedure, Liraglutide (Victoza, Saxenda) (daily)- Injection hold day prior and morning of procedure  Follow Up S/P: EP RN 3-4 PC Visit and EP ANETA 6wk (To be scheduled at time of case scheduling)    Allergies   Allergen Reactions    Penicillins Hives    Other Environmental Allergy Other (See Comments)     Hayfever Symptoms       Current Outpatient Medications:     apixaban ANTICOAGULANT (ELIQUIS) 5 MG tablet, Take 1 tablet (5 mg) by mouth 2 times daily., Disp: 90 tablet, Rfl: 3    metoprolol succinate ER (TOPROL XL) 25 MG 24 hr tablet, Take 1 tablet (25 mg) by mouth daily., Disp: 90 tablet, Rfl: 3    Documentation Date:1/27/2025 8:10 AM  Karo Barone RN

## 2025-03-10 NOTE — PROGRESS NOTES
[facility]  Cardiac Electrophysiology  [address]  [place] [address]   Office: [phone]  Fax: [phone]     HEART CARE ELECTROPHYSIOLOGY NOTE     Primary Care: Roshan Flores MD       Assessment/Recommendations     persistent atrial fibrillation/stage 3B:   Originally diagnosed at preop for colonoscopy.  He was asymptomatic, started on metoprolol.  Underwent successful cardioversion 11/27/2024.  Of note, he was in atrial flutter prior to cardioversion.    We discussed pulmonary vein isolation ablation procedure including <1-2% risk for major complication, anticipated success rates, recovery and follow-up.  Medical and surgical history reviewed and updated. Current medications and allergies reviewed and updated as appropriate. No personal or family history of adverse reactions to anesthesia or abnormal bleeding with surgery.    [id number] score of 1 for age    RANDA: Had sleep study at the end of May    Plan:  Continue Eliquis for stroke prophylaxis  Stop metoprolol 3 days prior to ablation  Start Protonix 40mg every day beginning 3 days prior to ablation and continuing for 6 weeks thereafter  EP follow-up 6 weeks post ablation     History of Present Illness/Subjective    Jayden Maciel is a 69 year old male with past medical history significant for persistent atrial fibrillation and atrial flutter, RANDA, seen today for history and physical prior to ablation with Dr. Forrest 3/17/2025.     Has race coming up Saturday, had a 5K earlier that went well and was able to mostly run.  He has 2 more 5 she is in the summer.  His goal is to get between 30 and 35 minutes.  He has been walking without issues.  Attempted to run this week, but was fatigued.  He attributes this to deconditioning.  Otherwise no complaints.    He denies chest discomfort, palpitations, shortness of breath, lightheadedness/dizziness, pedal edema, or syncope.       Data Review     Arrhythmia  hx:  Sx: Asymptomatic, possible decreased exercise tolerance  Sx onset: Unclear  Dx/date: 10/16/2024  Rate control: Metoprolol succinate 25 mg  AAD: None  DCCV: 11/27/24 100J (AFL)  Ablation: None  [id number] 1 for age  OAC: Eliquis 5 mg twice daily     EKG personally reviewed.   3/11/25: Sinus rhythm 68 bpm, QT/QTc 400/425 ms  10/16/2024: Atrial fibrillation with  bpm  11/27/2024: Sinus rhythm 80 bpm     TTE 11/20/24:  1. The left ventricle is normal in size. Left ventricular systolic performance  is at the lower limits of normal. The ejection fraction is estimated to be  50%.  2. There is mild aortic valve sclerosis without significant stenosis.  3. Normal right ventricular size with mildly reduced right ventricular  systolic performance.  4. There is mild-moderate biatrial enlargement.    I have reviewed and updated the patient's past medical history, allergies and medication list.               Physical Examination Review of Systems   BMI= There is no height or weight on file to calculate BMI.    Wt Readings from Last 3 Encounters:   01/24/25 88.9 kg (196 lb)   01/17/25 86.2 kg (190 lb)   11/26/24 83 kg (183 lb)       Vitals: There were no vitals taken for this visit.  General   Appearance:   Alert and oriented, in no acute distress.    HEENT:  Normocephalic and atraumatic. Conjunctiva and sclera are clear. Moist oral mucosa.    Neck: No JVP, carotid bruit or obvious thyromegaly.   Lungs:   Respirations unlabored. Clear bilaterally with no rales, rhonchi, or wheezes.     Cardiovascular:   Rhythm is regular. S1 and S2 are normal. No significant murmur is present. Lower extremities demonstrate no significant edema. Posterior tibial pulses are intact bilaterally.   Extremities: No cyanosis or clubbing   Skin: Skin is warm, dry, and otherwise intact.   Neurologic: Gait not asssessed. Mood and affect appropriate.                                                Medical History  Surgical History Family History  "Social History   No past medical history on file. No past surgical history on file. No family history on file. Social History     Tobacco Use    Smoking status: Never    Smokeless tobacco: Never   Substance Use Topics    Drug use: Never          Medications  Allergies   Current Outpatient Medications   Medication Sig Dispense Refill    apixaban ANTICOAGULANT (ELIQUIS) 5 MG tablet Take 1 tablet (5 mg) by mouth 2 times daily. 90 tablet 3    metoprolol succinate ER (TOPROL XL) 25 MG 24 hr tablet Take 1 tablet (25 mg) by mouth daily. 90 tablet 3    Allergies   Allergen Reactions    Penicillins Hives    Other Environmental Allergy Other (See Comments)     Hayfever Symptoms         Lab Results    Chemistry/lipid CBC Cardiac Enzymes/BNP/TSH/INR   Lab Results   Component Value Date    BUN 5.5 (L) 10/16/2024     10/16/2024    CO2 26 10/16/2024     No results found for: \"CREATININE\"    Lab Results   Component Value Date    CHOL 164 2018    HDL 56 2018    LDL 90 2018      Lab Results   Component Value Date    WBC 5.2 10/16/2024    HGB 16.7 10/16/2024    HCT 49.2 10/16/2024    MCV 94 10/16/2024     10/16/2024    Lab Results   Component Value Date    TSH 1.35 10/16/2024          The longitudinal plan of care for the diagnosis(es)/condition(s) as documented were addressed during this visit. Due to the added complexity in care, I will continue to support Jayden in the subsequent management and with ongoing continuity of care.     This note has been dictated using voice recognition software. Any grammatical, typographical, or context distortions are unintentional and inherent to the software.    Ishmael Ramirez PA-C  Clinical Cardiac Electrophysiology  [facility]  Clinic and [phone]  Fax: [phone]  Electrophysiology Nurses: [phone]                                  "

## 2025-03-10 NOTE — H&P (VIEW-ONLY)
[facility]  Cardiac Electrophysiology  [address]  [place] [address]   Office: [phone]  Fax: [phone]     HEART CARE ELECTROPHYSIOLOGY NOTE     Primary Care: Roshan Flores MD       Assessment/Recommendations     persistent atrial fibrillation/stage 3B:   Originally diagnosed at preop for colonoscopy.  He was asymptomatic, started on metoprolol.  Underwent successful cardioversion 11/27/2024.  Of note, he was in atrial flutter prior to cardioversion.    We discussed pulmonary vein isolation ablation procedure including <1-2% risk for major complication, anticipated success rates, recovery and follow-up.  Medical and surgical history reviewed and updated. Current medications and allergies reviewed and updated as appropriate. No personal or family history of adverse reactions to anesthesia or abnormal bleeding with surgery.    [id number] score of 1 for age    RANDA: Had sleep study at the end of May    Plan:  Continue Eliquis for stroke prophylaxis  Stop metoprolol 3 days prior to ablation  Start Protonix 40mg every day beginning 3 days prior to ablation and continuing for 6 weeks thereafter  EP follow-up 6 weeks post ablation     History of Present Illness/Subjective    Jayden Maciel is a 69 year old male with past medical history significant for persistent atrial fibrillation and atrial flutter, RANDA, seen today for history and physical prior to ablation with Dr. Forrest 3/17/2025.     Has race coming up Saturday, had a 5K earlier that went well and was able to mostly run.  He has 2 more 5 she is in the summer.  His goal is to get between 30 and 35 minutes.  He has been walking without issues.  Attempted to run this week, but was fatigued.  He attributes this to deconditioning.  Otherwise no complaints.    He denies chest discomfort, palpitations, shortness of breath, lightheadedness/dizziness, pedal edema, or syncope.       Data Review     Arrhythmia  hx:  Sx: Asymptomatic, possible decreased exercise tolerance  Sx onset: Unclear  Dx/date: 10/16/2024  Rate control: Metoprolol succinate 25 mg  AAD: None  DCCV: 11/27/24 100J (AFL)  Ablation: None  [id number] 1 for age  OAC: Eliquis 5 mg twice daily     EKG personally reviewed.   3/11/25: Sinus rhythm 68 bpm, QT/QTc 400/425 ms  10/16/2024: Atrial fibrillation with  bpm  11/27/2024: Sinus rhythm 80 bpm     TTE 11/20/24:  1. The left ventricle is normal in size. Left ventricular systolic performance  is at the lower limits of normal. The ejection fraction is estimated to be  50%.  2. There is mild aortic valve sclerosis without significant stenosis.  3. Normal right ventricular size with mildly reduced right ventricular  systolic performance.  4. There is mild-moderate biatrial enlargement.    I have reviewed and updated the patient's past medical history, allergies and medication list.               Physical Examination Review of Systems   BMI= There is no height or weight on file to calculate BMI.    Wt Readings from Last 3 Encounters:   01/24/25 88.9 kg (196 lb)   01/17/25 86.2 kg (190 lb)   11/26/24 83 kg (183 lb)       Vitals: There were no vitals taken for this visit.  General   Appearance:   Alert and oriented, in no acute distress.    HEENT:  Normocephalic and atraumatic. Conjunctiva and sclera are clear. Moist oral mucosa.    Neck: No JVP, carotid bruit or obvious thyromegaly.   Lungs:   Respirations unlabored. Clear bilaterally with no rales, rhonchi, or wheezes.     Cardiovascular:   Rhythm is regular. S1 and S2 are normal. No significant murmur is present. Lower extremities demonstrate no significant edema. Posterior tibial pulses are intact bilaterally.   Extremities: No cyanosis or clubbing   Skin: Skin is warm, dry, and otherwise intact.   Neurologic: Gait not asssessed. Mood and affect appropriate.                                                Medical History  Surgical History Family History  "Social History   No past medical history on file. No past surgical history on file. No family history on file. Social History     Tobacco Use    Smoking status: Never    Smokeless tobacco: Never   Substance Use Topics    Drug use: Never          Medications  Allergies   Current Outpatient Medications   Medication Sig Dispense Refill    apixaban ANTICOAGULANT (ELIQUIS) 5 MG tablet Take 1 tablet (5 mg) by mouth 2 times daily. 90 tablet 3    metoprolol succinate ER (TOPROL XL) 25 MG 24 hr tablet Take 1 tablet (25 mg) by mouth daily. 90 tablet 3    Allergies   Allergen Reactions    Penicillins Hives    Other Environmental Allergy Other (See Comments)     Hayfever Symptoms         Lab Results    Chemistry/lipid CBC Cardiac Enzymes/BNP/TSH/INR   Lab Results   Component Value Date    BUN 5.5 (L) 10/16/2024     10/16/2024    CO2 26 10/16/2024     No results found for: \"CREATININE\"    Lab Results   Component Value Date    CHOL 164 2018    HDL 56 2018    LDL 90 2018      Lab Results   Component Value Date    WBC 5.2 10/16/2024    HGB 16.7 10/16/2024    HCT 49.2 10/16/2024    MCV 94 10/16/2024     10/16/2024    Lab Results   Component Value Date    TSH 1.35 10/16/2024          The longitudinal plan of care for the diagnosis(es)/condition(s) as documented were addressed during this visit. Due to the added complexity in care, I will continue to support Jayden in the subsequent management and with ongoing continuity of care.     This note has been dictated using voice recognition software. Any grammatical, typographical, or context distortions are unintentional and inherent to the software.    Ishmael Ramirez PA-C  Clinical Cardiac Electrophysiology  [facility]  Clinic and [phone]  Fax: [phone]  Electrophysiology Nurses: [phone]                                  "

## 2025-03-11 ENCOUNTER — ALLIED HEALTH/NURSE VISIT (OUTPATIENT)
Dept: CARDIOLOGY | Facility: CLINIC | Age: 70
End: 2025-03-11
Payer: MEDICARE

## 2025-03-11 ENCOUNTER — OFFICE VISIT (OUTPATIENT)
Dept: CARDIOLOGY | Facility: CLINIC | Age: 70
End: 2025-03-11
Payer: MEDICARE

## 2025-03-11 ENCOUNTER — LAB (OUTPATIENT)
Dept: CARDIOLOGY | Facility: CLINIC | Age: 70
End: 2025-03-11
Payer: MEDICARE

## 2025-03-11 VITALS
WEIGHT: 198.6 LBS | DIASTOLIC BLOOD PRESSURE: 62 MMHG | HEIGHT: 68 IN | SYSTOLIC BLOOD PRESSURE: 142 MMHG | BODY MASS INDEX: 30.1 KG/M2 | HEART RATE: 68 BPM | RESPIRATION RATE: 16 BRPM

## 2025-03-11 DIAGNOSIS — I48.91 ATRIAL FIBRILLATION WITH RAPID VENTRICULAR RESPONSE (H): ICD-10-CM

## 2025-03-11 DIAGNOSIS — I48.91 ATRIAL FIBRILLATION WITH RAPID VENTRICULAR RESPONSE (H): Primary | ICD-10-CM

## 2025-03-11 DIAGNOSIS — I48.19 PERSISTENT ATRIAL FIBRILLATION (H): Primary | ICD-10-CM

## 2025-03-11 DIAGNOSIS — I48.19 PERSISTENT ATRIAL FIBRILLATION (H): ICD-10-CM

## 2025-03-11 LAB
ANION GAP SERPL CALCULATED.3IONS-SCNC: 8 MMOL/L (ref 7–15)
ATRIAL RATE - MUSE: 68 BPM
BUN SERPL-MCNC: 14.4 MG/DL (ref 8–23)
CALCIUM SERPL-MCNC: 9.8 MG/DL (ref 8.8–10.4)
CHLORIDE SERPL-SCNC: 108 MMOL/L (ref 98–107)
CREAT SERPL-MCNC: 0.88 MG/DL (ref 0.67–1.17)
DIASTOLIC BLOOD PRESSURE - MUSE: NORMAL MMHG
EGFRCR SERPLBLD CKD-EPI 2021: >90 ML/MIN/1.73M2
ERYTHROCYTE [DISTWIDTH] IN BLOOD BY AUTOMATED COUNT: 12.4 % (ref 10–15)
GLUCOSE SERPL-MCNC: 123 MG/DL (ref 70–99)
HCO3 SERPL-SCNC: 26 MMOL/L (ref 22–29)
HCT VFR BLD AUTO: 45.2 % (ref 40–53)
HGB BLD-MCNC: 15.3 G/DL (ref 13.3–17.7)
INTERPRETATION ECG - MUSE: NORMAL
MCH RBC QN AUTO: 32 PG (ref 26.5–33)
MCHC RBC AUTO-ENTMCNC: 33.8 G/DL (ref 31.5–36.5)
MCV RBC AUTO: 95 FL (ref 78–100)
P AXIS - MUSE: 64 DEGREES
PLATELET # BLD AUTO: 164 10E3/UL (ref 150–450)
POTASSIUM SERPL-SCNC: 4.2 MMOL/L (ref 3.4–5.3)
PR INTERVAL - MUSE: 142 MS
QRS DURATION - MUSE: 88 MS
QT - MUSE: 400 MS
QTC - MUSE: 425 MS
R AXIS - MUSE: 62 DEGREES
RBC # BLD AUTO: 4.78 10E6/UL (ref 4.4–5.9)
SODIUM SERPL-SCNC: 142 MMOL/L (ref 135–145)
SYSTOLIC BLOOD PRESSURE - MUSE: NORMAL MMHG
T AXIS - MUSE: 67 DEGREES
VENTRICULAR RATE- MUSE: 68 BPM
WBC # BLD AUTO: 5.4 10E3/UL (ref 4–11)

## 2025-03-11 PROCEDURE — 99207 PR NO CHARGE NURSE ONLY: CPT

## 2025-03-11 PROCEDURE — 3078F DIAST BP <80 MM HG: CPT

## 2025-03-11 PROCEDURE — 3077F SYST BP >= 140 MM HG: CPT

## 2025-03-11 PROCEDURE — 80048 BASIC METABOLIC PNL TOTAL CA: CPT

## 2025-03-11 PROCEDURE — 36415 COLL VENOUS BLD VENIPUNCTURE: CPT

## 2025-03-11 PROCEDURE — G2211 COMPLEX E/M VISIT ADD ON: HCPCS

## 2025-03-11 PROCEDURE — 93000 ELECTROCARDIOGRAM COMPLETE: CPT | Performed by: GENERAL ACUTE CARE HOSPITAL

## 2025-03-11 PROCEDURE — 99213 OFFICE O/P EST LOW 20 MIN: CPT

## 2025-03-11 PROCEDURE — 85027 COMPLETE CBC AUTOMATED: CPT

## 2025-03-11 RX ORDER — PANTOPRAZOLE SODIUM 40 MG/1
40 TABLET, DELAYED RELEASE ORAL DAILY
Qty: 45 TABLET | Refills: 0 | Status: SHIPPED | OUTPATIENT
Start: 2025-03-11

## 2025-03-11 NOTE — LETTER
3/11/2025    Roshan Flores MD  [facility] [address]  [facility] [address]    RE: Jayden Maciel       Dear Colleague,     I had the pleasure of seeing Jayden Maciel in the [facility].       [facility]  Cardiac Electrophysiology  [address]  [place] [address]   Office: [phone]  Fax: [phone]     HEART CARE ELECTROPHYSIOLOGY NOTE     Primary Care: Roshan Flores MD       Assessment/Recommendations     persistent atrial fibrillation/stage 3B:   Originally diagnosed at preop for colonoscopy.  He was asymptomatic, started on metoprolol.  Underwent successful cardioversion 11/27/2024.  Of note, he was in atrial flutter prior to cardioversion.    We discussed pulmonary vein isolation ablation procedure including <1-2% risk for major complication, anticipated success rates, recovery and follow-up.  Medical and surgical history reviewed and updated. Current medications and allergies reviewed and updated as appropriate. No personal or family history of adverse reactions to anesthesia or abnormal bleeding with surgery.    [id number] score of 1 for age    RANDA: Had sleep study at the end of May    Plan:  Continue Eliquis for stroke prophylaxis  Stop metoprolol 3 days prior to ablation  Start Protonix 40mg every day beginning 3 days prior to ablation and continuing for 6 weeks thereafter  EP follow-up 6 weeks post ablation     History of Present Illness/Subjective    Jayden Maciel is a 69 year old male with past medical history significant for persistent atrial fibrillation and atrial flutter, RANDA, seen today for history and physical prior to ablation with Dr. Forrest 3/17/2025.     Has race coming up Saturday, had a 5K earlier that went well and was able to mostly run.  He has 2 more 5 she is in the summer.  His goal is to get between 30 and 35 minutes.  He has been walking without issues.  Attempted to run this  week, but was fatigued.  He attributes this to deconditioning.  Otherwise no complaints.    He denies chest discomfort, palpitations, shortness of breath, lightheadedness/dizziness, pedal edema, or syncope.       Data Review     Arrhythmia hx:  Sx: Asymptomatic, possible decreased exercise tolerance  Sx onset: Unclear  Dx/date: 10/16/2024  Rate control: Metoprolol succinate 25 mg  AAD: None  DCCV: 11/27/24 100J (AFL)  Ablation: None  [id number] 1 for age  OAC: Eliquis 5 mg twice daily     EKG personally reviewed.   3/11/25: Sinus rhythm 68 bpm, QT/QTc 400/425 ms  10/16/2024: Atrial fibrillation with  bpm  11/27/2024: Sinus rhythm 80 bpm     TTE 11/20/24:  1. The left ventricle is normal in size. Left ventricular systolic performance  is at the lower limits of normal. The ejection fraction is estimated to be  50%.  2. There is mild aortic valve sclerosis without significant stenosis.  3. Normal right ventricular size with mildly reduced right ventricular  systolic performance.  4. There is mild-moderate biatrial enlargement.    I have reviewed and updated the patient's past medical history, allergies and medication list.               Physical Examination Review of Systems   BMI= There is no height or weight on file to calculate BMI.    Wt Readings from Last 3 Encounters:   01/24/25 88.9 kg (196 lb)   01/17/25 86.2 kg (190 lb)   11/26/24 83 kg (183 lb)       Vitals: There were no vitals taken for this visit.  General   Appearance:   Alert and oriented, in no acute distress.    HEENT:  Normocephalic and atraumatic. Conjunctiva and sclera are clear. Moist oral mucosa.    Neck: No JVP, carotid bruit or obvious thyromegaly.   Lungs:   Respirations unlabored. Clear bilaterally with no rales, rhonchi, or wheezes.     Cardiovascular:   Rhythm is regular. S1 and S2 are normal. No significant murmur is present. Lower extremities demonstrate no significant edema. Posterior tibial pulses are intact bilaterally.  "  Extremities: No cyanosis or clubbing   Skin: Skin is warm, dry, and otherwise intact.   Neurologic: Gait not asssessed. Mood and affect appropriate.                                                Medical History  Surgical History Family History Social History   No past medical history on file. No past surgical history on file. No family history on file. Social History     Tobacco Use     Smoking status: Never     Smokeless tobacco: Never   Substance Use Topics     Drug use: Never          Medications  Allergies   Current Outpatient Medications   Medication Sig Dispense Refill     apixaban ANTICOAGULANT (ELIQUIS) 5 MG tablet Take 1 tablet (5 mg) by mouth 2 times daily. 90 tablet 3     metoprolol succinate ER (TOPROL XL) 25 MG 24 hr tablet Take 1 tablet (25 mg) by mouth daily. 90 tablet 3    Allergies   Allergen Reactions     Penicillins Hives     Other Environmental Allergy Other (See Comments)     Hayfever Symptoms         Lab Results    Chemistry/lipid CBC Cardiac Enzymes/BNP/TSH/INR   Lab Results   Component Value Date    BUN 5.5 (L) 10/16/2024     10/16/2024    CO2 26 10/16/2024     No results found for: \"CREATININE\"    Lab Results   Component Value Date    CHOL 164 02/20/2018    HDL 56 02/20/2018    LDL 90 02/20/2018      Lab Results   Component Value Date    WBC 5.2 10/16/2024    HGB 16.7 10/16/2024    HCT 49.2 10/16/2024    MCV 94 10/16/2024     10/16/2024    Lab Results   Component Value Date    TSH 1.35 10/16/2024          The longitudinal plan of care for the diagnosis(es)/condition(s) as documented were addressed during this visit. Due to the added complexity in care, I will continue to support Jayden in the subsequent management and with ongoing continuity of care.     This note has been dictated using voice recognition software. Any grammatical, typographical, or context distortions are unintentional and inherent to the software.    Ishmael Ramirez PA-C  Clinical Cardiac Electrophysiology  [facility]  Clinic and [phone]  Fax: [phone]  Electrophysiology Nurses: [phone]                                    Thank you for allowing me to participate in the care of your patient.      Sincerely,     KALEB Saldana [facility]  cc:   Lakeisha Forrest MD  [address] [place] [id number]

## 2025-03-11 NOTE — PROGRESS NOTES
Pre-Procedure Pulmonary Vein Ablation (AF) Education    Procedure: PVI with Dr Forrest on 3/17/25 with arrival time 7:00 am    COVID: Pt denies COVID like symptoms, and is aware if he/she develops COVID like symptoms they would need to complete an at home with a rapid antigen COVID test 1-2 days prior to your procedure date. If COVID + pt is aware the procedure will need to be rescheduled, and to contact CV scheduling as soon as possible    Type & Screen: Is not required for PVI Ablation    Pre-Op H&P: Completed today with EP ANETA- See record in Epic    Education:   Reviewed with pt in Clinic today  Pre-Procedure Instruction: NPO after midnight pre procedure, Defined NPO, Remove all jewelry and leave all valuables at home, Shower prior to arrival, Anesthesia and intubation plan/orders, Intra-procedure PVI process, Post- PVI procedure expectations/recovery, Transportation requirements and arrangements post procedure, Post-procedure follow up process, Letter sent to pt via [employer] and mail with written instructions (Refer to letters tab), Lab results would be called to pt if abnormal  Risks:   Atrial Fibrillation Ablation/Left Atrial Ablation  Cardiac Ablation  <1% Hypotension, Hemorrhage, Thrombophlebitis, Systemic or pulmonic emboli, Cardiac perforation (tamponade), Infection, Pneumothorax, Arrhythmias, Proarrhythmic effects of drugs, Radiation exposure, Catheter entrapment  <1 % Vascular injury including perforation of vein, artery or heart  1-2% Tamponade and Aortic puncture with left sided transeptal approach  1% CVA   <1% MI  <0.1% death  If external defibrillation or CV is needed, 25% risk for superficial burn  Risks associated with general anesthesia will be addressed by the Anesthesiology Department  Radiofrequency Risks:  In addition to standard risks for Radiofrequency Ablation, there is:  <2% Significant pulmonary vein stenosis  <2% Embolic events  <1% Esophageal fistula  <1% Phrenic nerve paralysis    Cryoablation Risks:  In addition to standard risks for Cryoablation Ablation, there is:  <1% Phrenic nerve paralysis  <1% Pulmonary vein stenosis  <1% Esophageal fistula    Medication:   Instructions regarding anticoagulants: Eliquis- Continue anticoagulation uninterrupted through their procedure, do not miss any doses of AC prior to procedure, importance of taking AC for stroke prevention, taking AC as prescribed, to call prior to PVI if missed a dose of AC, and if upon arrival pt reports missing a dose of AC PVI will potentially be cnx/postponed  Instructions given to pt regarding antiarrhythmic medication: Metoprolol- hold 3 days prior to procedure, and will be started on 3/14/25  Instructions given to pt regarding PPI medication: Start Protonix 40mg Daily 3 days prior, 6wk post  Instructions given to pt regarding diuretics medication: None  Instructions given to pt regarding DM/GLP-1 medication:   DM- None  GLP-1- None  Instructions for medication, other than anticoagulants and antiarrhythmics listed above, given to pt: Take all medication AM of procedure with small sips of water     Important patient information for staff: None    3/11/2025 9:19 AM  Karo Barone RN

## 2025-03-11 NOTE — PATIENT INSTRUCTIONS
Jayden Maciel,    It was a pleasure to see you today at the [facility].     Before ablation:   -Continue your eliquis as ordered for stroke prevention. Take this with a sip of water on the morning of your procedure.  -Stop metoprolol after your last dose on 3/16/25.   -On 3/17/25, start pantoprazole 40mg by mouth once a day. We will continue this medication for the next 6 weeks after ablation.     After ablation:   -Blood thinner/anticoagulation must be continued, without interruption, for at least 3 months following your ablation.   -No driving for 3 days after your procedure. No lifting more than 10-20 pounds or strenuous exercise for 3-5 days after your procedure to allow for groin site healing.   -Short episodes of atrial fibrillation can be common after your procedure. Call the office if episodes are lasting longer than 4 hours.  -Call the office if you are experiencing increasing bleeding or pain at groin sites, lump that is larger than a walnut, or fever.   -Dial 911 if you have any NEW signs or symptoms of a stroke, including but not limited to injuries in vision, problems talking, numbness on one side of your face or body, sudden headache, confusion, or problems with walking.  -Do not hesitate to call the office with additional questions or concerns.    Ishmael Ramirez PA-C  Clinical Cardiac Electrophysiology  [facility]  Office: [phone]  Fax: [phone]   Nurses: [phone]

## 2025-03-17 ENCOUNTER — HOSPITAL ENCOUNTER (OUTPATIENT)
Facility: HOSPITAL | Age: 70
Discharge: HOME | End: 2025-03-17
Attending: INTERNAL MEDICINE | Admitting: INTERNAL MEDICINE
Payer: COMMERCIAL

## 2025-03-17 ENCOUNTER — ANESTHESIA EVENT (OUTPATIENT)
Dept: CARDIOLOGY | Facility: HOSPITAL | Age: 70
End: 2025-03-17
Payer: COMMERCIAL

## 2025-03-17 ENCOUNTER — ANESTHESIA (OUTPATIENT)
Dept: CARDIOLOGY | Facility: HOSPITAL | Age: 70
End: 2025-03-17
Payer: COMMERCIAL

## 2025-03-17 VITALS
BODY MASS INDEX: 27.58 KG/M2 | DIASTOLIC BLOOD PRESSURE: 64 MMHG | RESPIRATION RATE: 16 BRPM | WEIGHT: 182 LBS | OXYGEN SATURATION: 97 % | HEART RATE: 77 BPM | SYSTOLIC BLOOD PRESSURE: 122 MMHG | TEMPERATURE: 98 F | HEIGHT: 68 IN

## 2025-03-17 DIAGNOSIS — I48.91 ATRIAL FIBRILLATION WITH RAPID VENTRICULAR RESPONSE (H): ICD-10-CM

## 2025-03-17 DIAGNOSIS — I48.19 PERSISTENT ATRIAL FIBRILLATION (H): ICD-10-CM

## 2025-03-17 LAB
ACT BLD: 337 SECONDS (ref 74–150)
ACT BLD: 341 SECONDS (ref 74–150)
ATRIAL RATE - MUSE: 73 BPM
DIASTOLIC BLOOD PRESSURE - MUSE: NORMAL MMHG
INTERPRETATION ECG - MUSE: NORMAL
P AXIS - MUSE: 70 DEGREES
PR INTERVAL - MUSE: 134 MS
QRS DURATION - MUSE: 80 MS
QT - MUSE: 406 MS
QTC - MUSE: 447 MS
R AXIS - MUSE: 55 DEGREES
SYSTOLIC BLOOD PRESSURE - MUSE: NORMAL MMHG
T AXIS - MUSE: 49 DEGREES
VENTRICULAR RATE- MUSE: 73 BPM

## 2025-03-17 PROCEDURE — 93656 COMPRE EP EVAL ABLTJ ATR FIB: CPT | Performed by: INTERNAL MEDICINE

## 2025-03-17 PROCEDURE — C1732 CATH, EP, DIAG/ABL, 3D/VECT: HCPCS | Performed by: INTERNAL MEDICINE

## 2025-03-17 PROCEDURE — 258N000003 HC RX IP 258 OP 636: Performed by: NURSE ANESTHETIST, CERTIFIED REGISTERED

## 2025-03-17 PROCEDURE — 999N000054 HC STATISTIC EKG NON-CHARGEABLE

## 2025-03-17 PROCEDURE — 93655 ICAR CATH ABLTJ DSCRT ARRHYT: CPT | Performed by: INTERNAL MEDICINE

## 2025-03-17 PROCEDURE — 85347 COAGULATION TIME ACTIVATED: CPT

## 2025-03-17 PROCEDURE — C1759 CATH, INTRA ECHOCARDIOGRAPHY: HCPCS | Performed by: INTERNAL MEDICINE

## 2025-03-17 PROCEDURE — C1894 INTRO/SHEATH, NON-LASER: HCPCS | Performed by: INTERNAL MEDICINE

## 2025-03-17 PROCEDURE — C1733 CATH, EP, OTHR THAN COOL-TIP: HCPCS | Performed by: INTERNAL MEDICINE

## 2025-03-17 PROCEDURE — 272N000001 HC OR GENERAL SUPPLY STERILE: Performed by: INTERNAL MEDICINE

## 2025-03-17 PROCEDURE — 250N000009 HC RX 250: Performed by: NURSE ANESTHETIST, CERTIFIED REGISTERED

## 2025-03-17 PROCEDURE — 258N000003 HC RX IP 258 OP 636: Performed by: INTERNAL MEDICINE

## 2025-03-17 PROCEDURE — 93615 ESOPHAGEAL RECORDING: CPT | Mod: 26 | Performed by: INTERNAL MEDICINE

## 2025-03-17 PROCEDURE — 370N000017 HC ANESTHESIA TECHNICAL FEE, PER MIN: Performed by: INTERNAL MEDICINE

## 2025-03-17 PROCEDURE — C1766 INTRO/SHEATH,STRBLE,NON-PEEL: HCPCS | Performed by: INTERNAL MEDICINE

## 2025-03-17 PROCEDURE — 250N000011 HC RX IP 250 OP 636: Performed by: NURSE ANESTHETIST, CERTIFIED REGISTERED

## 2025-03-17 PROCEDURE — 93615 ESOPHAGEAL RECORDING: CPT | Performed by: INTERNAL MEDICINE

## 2025-03-17 PROCEDURE — C1730 CATH, EP, 19 OR FEW ELECT: HCPCS | Performed by: INTERNAL MEDICINE

## 2025-03-17 PROCEDURE — 93005 ELECTROCARDIOGRAM TRACING: CPT

## 2025-03-17 PROCEDURE — 710N000010 HC RECOVERY PHASE 1, LEVEL 2, PER MIN

## 2025-03-17 PROCEDURE — 93653 COMPRE EP EVAL TX SVT: CPT | Performed by: INTERNAL MEDICINE

## 2025-03-17 PROCEDURE — 250N000011 HC RX IP 250 OP 636: Performed by: INTERNAL MEDICINE

## 2025-03-17 PROCEDURE — C1887 CATHETER, GUIDING: HCPCS | Performed by: INTERNAL MEDICINE

## 2025-03-17 RX ORDER — LIDOCAINE HYDROCHLORIDE 10 MG/ML
INJECTION, SOLUTION INFILTRATION; PERINEURAL PRN
Status: DISCONTINUED | OUTPATIENT
Start: 2025-03-17 | End: 2025-03-17

## 2025-03-17 RX ORDER — ONDANSETRON 4 MG/1
4 TABLET, ORALLY DISINTEGRATING ORAL EVERY 6 HOURS PRN
Status: DISCONTINUED | OUTPATIENT
Start: 2025-03-17 | End: 2025-03-17 | Stop reason: HOSPADM

## 2025-03-17 RX ORDER — HEPARIN SODIUM 10000 [USP'U]/100ML
INJECTION, SOLUTION INTRAVENOUS CONTINUOUS PRN
Status: DISCONTINUED | OUTPATIENT
Start: 2025-03-17 | End: 2025-03-17 | Stop reason: HOSPADM

## 2025-03-17 RX ORDER — PROPOFOL 10 MG/ML
INJECTION, EMULSION INTRAVENOUS PRN
Status: DISCONTINUED | OUTPATIENT
Start: 2025-03-17 | End: 2025-03-17

## 2025-03-17 RX ORDER — FENTANYL CITRATE 50 UG/ML
INJECTION, SOLUTION INTRAMUSCULAR; INTRAVENOUS PRN
Status: DISCONTINUED | OUTPATIENT
Start: 2025-03-17 | End: 2025-03-17

## 2025-03-17 RX ORDER — SODIUM CHLORIDE 9 MG/ML
100 INJECTION, SOLUTION INTRAVENOUS CONTINUOUS
Status: DISCONTINUED | OUTPATIENT
Start: 2025-03-17 | End: 2025-03-17 | Stop reason: HOSPADM

## 2025-03-17 RX ORDER — SODIUM CHLORIDE 9 MG/ML
INJECTION, SOLUTION INTRAVENOUS CONTINUOUS PRN
Status: DISCONTINUED | OUTPATIENT
Start: 2025-03-17 | End: 2025-03-17

## 2025-03-17 RX ORDER — ONDANSETRON 2 MG/ML
INJECTION INTRAMUSCULAR; INTRAVENOUS PRN
Status: DISCONTINUED | OUTPATIENT
Start: 2025-03-17 | End: 2025-03-17

## 2025-03-17 RX ORDER — ONDANSETRON 2 MG/ML
4 INJECTION INTRAMUSCULAR; INTRAVENOUS EVERY 6 HOURS PRN
Status: DISCONTINUED | OUTPATIENT
Start: 2025-03-17 | End: 2025-03-17 | Stop reason: HOSPADM

## 2025-03-17 RX ORDER — IBUPROFEN 600 MG/1
600 TABLET, FILM COATED ORAL EVERY 6 HOURS PRN
Status: DISCONTINUED | OUTPATIENT
Start: 2025-03-17 | End: 2025-03-17 | Stop reason: HOSPADM

## 2025-03-17 RX ORDER — HEPARIN SODIUM 1000 [USP'U]/ML
INJECTION, SOLUTION INTRAVENOUS; SUBCUTANEOUS
Status: DISCONTINUED | OUTPATIENT
Start: 2025-03-17 | End: 2025-03-17 | Stop reason: HOSPADM

## 2025-03-17 RX ORDER — LIDOCAINE 40 MG/G
CREAM TOPICAL
Status: DISCONTINUED | OUTPATIENT
Start: 2025-03-17 | End: 2025-03-17 | Stop reason: HOSPADM

## 2025-03-17 RX ORDER — ACETAMINOPHEN 325 MG/1
650 TABLET ORAL EVERY 4 HOURS PRN
Status: DISCONTINUED | OUTPATIENT
Start: 2025-03-17 | End: 2025-03-17 | Stop reason: HOSPADM

## 2025-03-17 RX ORDER — PROTAMINE SULFATE 10 MG/ML
INJECTION, SOLUTION INTRAVENOUS PRN
Status: DISCONTINUED | OUTPATIENT
Start: 2025-03-17 | End: 2025-03-17

## 2025-03-17 RX ADMIN — FENTANYL CITRATE 100 MCG: 50 INJECTION, SOLUTION INTRAMUSCULAR; INTRAVENOUS at 08:45

## 2025-03-17 RX ADMIN — ONDANSETRON 4 MG: 2 INJECTION INTRAMUSCULAR; INTRAVENOUS at 09:58

## 2025-03-17 RX ADMIN — SODIUM CHLORIDE 100 ML/HR: 0.9 INJECTION, SOLUTION INTRAVENOUS at 08:15

## 2025-03-17 RX ADMIN — PHENYLEPHRINE HYDROCHLORIDE 0.2 MCG/KG/MIN: 10 INJECTION INTRAVENOUS at 08:46

## 2025-03-17 RX ADMIN — LIDOCAINE HYDROCHLORIDE 5 ML: 10 INJECTION, SOLUTION INFILTRATION; PERINEURAL at 08:46

## 2025-03-17 RX ADMIN — SUGAMMADEX 200 MG: 100 INJECTION, SOLUTION INTRAVENOUS at 10:01

## 2025-03-17 RX ADMIN — PROTAMINE SULFATE 60 MG: 10 INJECTION, SOLUTION INTRAVENOUS at 09:58

## 2025-03-17 RX ADMIN — ROCURONIUM 70 MG: 50 INJECTION, SOLUTION INTRAVENOUS at 08:46

## 2025-03-17 RX ADMIN — SODIUM CHLORIDE: 9 INJECTION, SOLUTION INTRAVENOUS at 08:31

## 2025-03-17 RX ADMIN — PHENYLEPHRINE HYDROCHLORIDE 200 MCG: 10 INJECTION INTRAVENOUS at 08:58

## 2025-03-17 RX ADMIN — PROPOFOL 200 MG: 10 INJECTION, EMULSION INTRAVENOUS at 08:45

## 2025-03-17 ASSESSMENT — ACTIVITIES OF DAILY LIVING (ADL)
ADLS_ACUITY_SCORE: 41

## 2025-03-17 NOTE — ANESTHESIA PROCEDURE NOTES
Airway       Patient location during procedure: OR       Procedure Start/Stop Times: 3/17/2025 8:48 AM  Staff -        CRNA: Elmer Orellana APRN CRNA       Performed By: CRNA  Consent for Airway        Urgency: elective  Indications and Patient Condition       Indications for airway management: allen-procedural       Induction type:intravenous       Mask difficulty assessment: 1 - vent by mask    Final Airway Details       Final airway type: endotracheal airway    Endotracheal Airway Details        Cuffed: yes       Successful intubation technique: direct laryngoscopy       DL Blade Type: Anthony 2       Grade View of Cords: 1       Adjucts: stylet       Position: Right       Measured from: lips       Secured at (cm): 22       Bite block used: None    Post intubation assessment        Placement verified by: capnometry, equal breath sounds and chest rise        Number of attempts at approach: 1       Number of other approaches attempted: 0       Secured with: tape       Ease of procedure: easy       Dentition: Intact and Unchanged       Dental guard used and removed. Dental Guard Type: Standard White.    Medication(s) Administered   Medication Administration Time: 3/17/2025 8:48 AM

## 2025-03-17 NOTE — Clinical Note
Arrhythmia Type: atrial fibrillation.   Method of Cardioversion: synchronous.   The arrhythmia was terminated.   Energy shock delivered: 250 joules.   Time shock delivered: 09:09 CDT.   Post cardioversion rhythm: sinus rhythm.

## 2025-03-17 NOTE — ANESTHESIA PREPROCEDURE EVALUATION
"Anesthesia Pre-Procedure Evaluation    Patient: Jayden Maciel   MRN: [id number] : 1955        Procedure : Procedure(s):  Ablation Atrial Fibrillation          No past medical history on file.   No past surgical history on file.   Allergies   Allergen Reactions    Penicillins Hives    Other Environmental Allergy Other (See Comments)     Hayfever Symptoms      Social History     Tobacco Use    Smoking status: Never    Smokeless tobacco: Never   Substance Use Topics    Alcohol use: Not on file      Wt Readings from Last 1 Encounters:   25 82.6 kg (182 lb)           Physical Exam    Airway        Mallampati: III   TM distance: > 3 FB   Neck ROM: full   Mouth opening: > 3 cm    Respiratory Devices and Support         Dental    unable to assess        Cardiovascular   cardiovascular exam normal          Pulmonary   pulmonary exam normal                OUTSIDE LABS:  CBC:   Lab Results   Component Value Date    WBC 5.4 2025    WBC 5.2 10/16/2024    HGB 15.3 2025    HGB 16.7 10/16/2024    HCT 45.2 2025    HCT 49.2 10/16/2024     2025     10/16/2024     BMP:   Lab Results   Component Value Date     2025     10/16/2024    POTASSIUM 4.2 2025    POTASSIUM 4.8 10/16/2024    CHLORIDE 108 (H) 2025    CHLORIDE 106 10/16/2024    CO2 26 2025    CO2 26 10/16/2024    BUN 14.4 2025    BUN 5.5 (L) 10/16/2024    CR 0.88 2025    CR 0.84 10/16/2024     (H) 2025    GLC 92 10/16/2024     COAGS: No results found for: \"PTT\", \"INR\", \"FIBR\"  POC: No results found for: \"BGM\", \"HCG\", \"HCGS\"  HEPATIC:   Lab Results   Component Value Date    ALBUMIN 4.5 10/16/2024    PROTTOTAL 7.9 10/16/2024    ALT 28 10/16/2024    AST 30 10/16/2024    ALKPHOS 143 10/16/2024    BILITOTAL 0.7 10/16/2024     OTHER:   Lab Results   Component Value Date    LAMONTE 9.8 2025    MAG 2.3 10/16/2024    TSH 1.35 10/16/2024       Anesthesia Plan    ASA Status: " " 3       Anesthesia Type: General.     - Airway: ETT   Induction: Intravenous.   Maintenance: Inhalation.        Consents    Anesthesia Plan(s) and associated risks, benefits, and realistic alternatives discussed. Questions answered and patient/representative(s) expressed understanding.     - Discussed: Risks, Benefits and Alternatives for BOTH SEDATION and the PROCEDURE were discussed     - Discussed with:  Patient      - Extended Intubation/Ventilatory Support Discussed: No.      - Patient is DNR/DNI Status: No     Use of blood products discussed: No .     Postoperative Care    Pain management: IV analgesics.        Comments:               Dejan Padilla MD    I have reviewed the pertinent notes and labs in the chart from the past 30 days and (re)examined the patient.  Any updates or changes from those notes are reflected in this note.    Clinically Significant Risk Factors Present on Admission                # Drug Induced Coagulation Defect: home medication list includes an anticoagulant medication              # Overweight: Estimated body mass index is 27.67 kg/m  as calculated from the following:    Height as of this encounter: 1.727 m (5' 8\").    Weight as of this encounter: 82.6 kg (182 lb).                "

## 2025-03-17 NOTE — Clinical Note
[employer] system 12 lead EKG, hemodynamics 5 lead, pulse oximetery, NIBP, Physiocontrol hands off defibrillator/external pacer, with 3 monitoring leads to patient. Baseline assessment done.

## 2025-03-17 NOTE — ANESTHESIA POSTPROCEDURE EVALUATION
Patient: Jayden Maciel    Procedure: Procedure(s):  Ablation Atrial Fibrillation       Anesthesia Type:  General    Note:  Disposition: Outpatient   Postop Pain Control: Uneventful            Sign Out: Well controlled pain   PONV: No   Neuro/Psych: Uneventful            Sign Out: Acceptable/Baseline neuro status   Airway/Respiratory: Uneventful            Sign Out: Acceptable/Baseline resp. status   CV/Hemodynamics: Uneventful            Sign Out: Acceptable CV status; No obvious hypovolemia; No obvious fluid overload   Other NRE: NONE   DID A NON-ROUTINE EVENT OCCUR? No           Last vitals:  Vitals Value Taken Time   /73 03/17/25 1026   Temp     Pulse 75 03/17/25 1029   Resp 25 03/17/25 1021   SpO2 98 % 03/17/25 1029   Vitals shown include unfiled device data.    Electronically Signed By: Dejan Padilla MD  March 17, 2025  10:36 AM

## 2025-03-17 NOTE — Clinical Note
Arrhythmia Type: atrial fibrillation.   Method of Cardioversion: synchronous.   The arrhythmia was terminated.   Energy shock delivered: 250 joules.   Time shock delivered: 09:08 CDT.   Post cardioversion rhythm: sinus rhythm.   Early return to atrial fibrillation (ERAF).

## 2025-03-17 NOTE — ANESTHESIA CARE TRANSFER NOTE
Patient: Jayden Maciel    Procedure: Procedure(s):  Ablation Atrial Fibrillation       Diagnosis: Persistent Atrial Fibrillation and Atrial Flutter  Diagnosis Additional Information: No value filed.    Anesthesia Type:   General     Note:    Oropharynx: oropharynx clear of all foreign objects  Level of Consciousness: awake  Oxygen Supplementation: face mask  Level of Supplemental Oxygen (L/min / FiO2): 6L  Independent Airway: airway patency satisfactory and stable  Dentition: dentition unchanged  Vital Signs Stable: post-procedure vital signs reviewed and stable  Report to RN Given: handoff report given  Patient transferred to: Cardiac Special Care          Vitals:  Vitals Value Taken Time   /71 1015   Temp 97.3f    Pulse 73    Resp 25    SpO2 99 %    Vitals shown include unfiled device data.    Electronically Signed By: VESNA Santoyo CRNA  March 17, 2025  11:03 AM

## 2025-03-17 NOTE — DISCHARGE INSTRUCTIONS
[facility]  Cardiac Electrophysiology  [address]  [place] [address]   Office: [phone]  Fax: [phone]     Cardiac Electrophysiology - Post Ablation Discharge Instructions      PROCEDURE   Atrial fibrillation ablation         MEDICATION INSTRUCTIONS   Continue taking all home meds  Continue taking your blood thinner .          DISCHARGE INSTRUCTIONS   General instructions  Have an adult stay with you until tomorrow.  You may resume your normal diet.    You may shower tomorrow.  Do NOT take a bath, or use a hot tub or pool for at least 1 week. Do not scrub the site. Do not use lotion or powder near the puncture site.    Groin care instructions  For the first 24 hrs - check the puncture site every 1-2 hours while awake.  You may keep a bandaid over the puncture sites for 1 or 2 days post-procedure and thereafter may keep these sites uncovered.  Change the bandaid daily.  If there is minor oozing, apply another bandaid and remove it after 12 hours.  For 2 days, when you cough, sneeze, laugh or move your bowels, hold your hand over the puncture site and press firmly.  Mild bruising at the access sites is normal.  If you notice increased swelling, external bleeding, or have other concerns regarding your access sites please consider emergency department evaluation and call your electrophysiology team's office    Activity recommendations  Do not drive for 3 days.  Avoid stooping or squatting more than 90 degrees at the hips for 7 days  Avoid repetitive motions such as loading , vacuuming, raking or shoveling  Avoid heavy lifting (greater than 25lbs) for 1 week    Post ablation instructions  You may have some irregular heartbeats following your ablation.  These may feel very strong and feel like atrial fibrillation re-initiation is imminent - these episodes should occur less frequently over time.  Recurrent atrial fibrillation can occur within the first 3 months  post ablation while your heart recovers from the procedure.  Pleuritic chest discomfort (chest pain worse with taking deep breaths, worse with laying flat on your back) can occur after ablation, usually coming about within the first 24-48hrs post ablation.  If this occurs and is severe enough to be troublesome to you, please call us and consider starting a course of ibuprofen 400mg three times daily for 5 to 7 days    Things to watch for  As with any type of procedure, please be more attentive to unusual symptoms post ablation (eg. fever, neurologic changes, pain with swallowing, loss of consciousness, etc) - we recommend ER evaluation for any such symptoms in the first few weeks post procedure.    Consider ER evaluation for the following:  Severe chest pain not relieved by Tylenol or Ibuprofen  You have chills or a fever greater than 101 F (38 C)  Neurologic changes (eg. leg, arm or face weakness or numbness, difficulties with speech or word finding, problems walking or with your balance, vision changes)  Severe difficulty swallowing and/or you are coughing up blood  Shortness of breath  Increased groin pain or a large or growing hard lump around the site  Groin is red, swollen, hot or tender  Blood or fluid is draining from the groin site  Any numbness, coolness or changes in color in your extremities  Groin pain not relieved by Tylenol or Advil  Recurrent atrial fibrillation associated with sustained rapid heart rates or associated with additional concerning symptoms.    Our office will have a follow-up visit scheduled for you in approximately 6 weeks.  Please do not hesitate to call us before that time should issues arise.        [facility]    [phone]    If you are calling after hours, please listen to the entire voicemail,   a live  will answer at the end of the message.    [phone] to reach the EP nurses working with Dr Forrest

## 2025-03-17 NOTE — INTERVAL H&P NOTE
"I have reviewed the surgical (or preoperative) H&P that is linked to this encounter, and examined the patient. There are no significant changes    Clinical Conditions Present on Arrival:  Clinically Significant Risk Factors Present on Admission          # Hyperchloremia: Highest Cl = 108 mmol/L in last 30 days, will monitor as appropriate            # Drug Induced Coagulation Defect: home medication list includes an anticoagulant medication       # Overweight: Estimated body mass index is 27.67 kg/m  as calculated from the following:    Height as of this encounter: 1.727 m (5' 8\").    Weight as of this encounter: 82.6 kg (182 lb).       "

## 2025-03-20 ENCOUNTER — VIRTUAL VISIT (OUTPATIENT)
Dept: CARDIOLOGY | Facility: CLINIC | Age: 70
End: 2025-03-20
Payer: MEDICARE

## 2025-03-20 DIAGNOSIS — Z98.890 STATUS POST ABLATION OF ATRIAL FIBRILLATION: Primary | ICD-10-CM

## 2025-03-20 DIAGNOSIS — Z86.79 STATUS POST ABLATION OF ATRIAL FIBRILLATION: Primary | ICD-10-CM

## 2025-03-20 NOTE — PROGRESS NOTES
Post PVI Procedural Follow Up Call    Pt is s/p PVI from 3/17 with Dr Castle  PC was placed to pt, spoke to pt    General Assessment:     Weight: Pt reports pt is unable to report weight, but denies s/s of fluid retention    Pain: Pt denies generalized or localized pain abnormal to healing s/p     /GI: Pt denies difficulty swallowing, denies constipation, denies urinary retention/difficulty, reports no s/s of infection, report normal appetite, and reports staying hydrated.    Neurological: Pt Denies any neurological changes, or s/s of CVA    Respiratory: Pt denies SOB, denies difficulty breathing, denies throat pain, denies changes/abnormal sputum, and denies any further symptoms abnormal to normal healing process s/p PVI.    Activity: Pt is tolerating advancement in activity while following physical restrictions, staying well hydrated, and gradually working into baseline activity.     Rhythm Assessment:   Pt denies palpitations, denies irregularities in HR or rhythm, and denies symptoms or sustained AF episodes.    Procedure Site Assessment:   Pts no visible/physical changes in groin sites    Anticoagulation/Medication:  Pt remain on Eliquis without interruption  Per guidelines by Dr Gavin palmer ASA needed upon discharge    Education completed with pt at this visit:  Reviewed normal post-op PVI healing process, when to contact EP-RN/EP-MD, contact information was given to the pt for further concerns or questions, and pt verbalized understanding    Follow up  Pts AVS was printed and mailed to pt by scheduling team and pt will be seen by EP NP at 6 wks, monitor will be ordered at this follow-up if indicated as well as 3mo follow-up with either EP ANETA or RIGOBERTO MAXWELL    3/20/2025 11:51 AM  Viviana Fox RN

## 2025-03-20 NOTE — PATIENT INSTRUCTIONS
Instructions Following your Ablation Procedure    Your anticoagulation medication Eliquis:  It is important to remain on your anticoagulation medication uninterrupted after your ablation to reduce your risk of a stroke or heart attack, do not stop this medication  Please contact me if you have any questions regarding your anticoagulation medication    Groin care instructions  Keep the site clean and dry, do not place a bandage over the site. If there is minor oozing, apply another bandaid and remove it after 12 hours.  Mild bruising at the access sites is normal. If you notice increased swelling, external bleeding, or have other concerns regarding your access sites please consider emergency department evaluation for significant changes and call your electrophysiology team's office.  You may experience mild discomfort at your groin sites, applying ice packs 20min 3-4 times a day can help alleviate this discomfort.    Activity recommendations  You can resume driving.  Avoid stooping or squatting more than 90 degrees at the hips, repetitive motions such as loading , vacuuming, raking or shoveling, and heavy lifting (greater than 25lbs) for 1 week  Increase your activity gradually over the next 5-10 days, working back to your normal daily activity/routine.    Post ablation instructions  Stay well hydrated, and increase your fluid intake during this recovery period  High protein foods aide in your bodies healing process  You may have some irregular heartbeats and/or atrial fibrillation following your ablation which is normal to recovery, these episodes should occur less frequently over time.   Recurrent atrial fibrillation can occur within the first 3 months post ablation while your heart recovers from the procedure. Please call the electrophysiology team's office if you have an episode lasting greater than 4 hours, or if you notice the episodes are increasing in frequency or duration  Pleuritic chest  discomfort (chest pain worse with taking deep breaths, worse with laying flat on your back) can occur after ablation, usually coming about within the first 24-48hrs post ablation. If this occurs and is severe enough to be troublesome to you, please call us and consider starting a course of ibuprofen 400mg three times daily for 5 to 7 days    Things to watch for  As with any type of procedure, please be more attentive to unusual symptoms post ablation (eg. fever, neurologic changes, pain with swallowing, loss of consciousness, etc) - we recommend ER evaluation for any such symptoms in the first few weeks post procedure.    Consider ER evaluation for the following:  Severe chest pain not relieved by Tylenol or Ibuprofen  You have chills or a fever greater than 101 F (38 C)  Neurologic changes (eg. leg, arm or face weakness or numbness, difficulties with speech or word finding, problems  walking or with your balance, vision changes)  Severe difficulty swallowing and/or you are coughing up blood  Shortness of breath  Increased groin pain or a large or growing hard lump around the site  Groin is red, swollen, hot or tender  Blood or fluid is draining from the groin site  Any numbness, coolness or changes in color in your extremities  Groin pain not relieved by Tylenol or Advil  Recurrent atrial fibrillation associated with sustained rapid heart rates or associated with additional concerning  symptoms.    Your follow up appointments are as follows:  You will be seen by the electrophysiologist nurse practitioner at 6 weeks after your ablation  At your 6 week appointment, a 3 month follow-up appointment will be arranged with either the Nurse Practitioner or the Electrophysiology provider     Sincerely,  Viviana Fox RN [phone]    After hours please contact the on call service at # [phone]

## 2025-04-17 DIAGNOSIS — I48.19 PERSISTENT ATRIAL FIBRILLATION (H): ICD-10-CM

## 2025-04-17 DIAGNOSIS — I48.91 ATRIAL FIBRILLATION WITH RAPID VENTRICULAR RESPONSE (H): ICD-10-CM

## 2025-04-17 RX ORDER — PANTOPRAZOLE SODIUM 40 MG/1
40 TABLET, DELAYED RELEASE ORAL DAILY
Qty: 90 TABLET | Refills: 1 | OUTPATIENT
Start: 2025-04-17

## 2025-04-29 ENCOUNTER — OFFICE VISIT (OUTPATIENT)
Dept: CARDIOLOGY | Facility: CLINIC | Age: 70
End: 2025-04-29
Payer: COMMERCIAL

## 2025-04-29 VITALS
WEIGHT: 196 LBS | DIASTOLIC BLOOD PRESSURE: 66 MMHG | HEART RATE: 60 BPM | HEIGHT: 68 IN | SYSTOLIC BLOOD PRESSURE: 144 MMHG | RESPIRATION RATE: 16 BRPM | BODY MASS INDEX: 29.7 KG/M2

## 2025-04-29 DIAGNOSIS — I48.19 PERSISTENT ATRIAL FIBRILLATION (H): ICD-10-CM

## 2025-04-29 PROCEDURE — G2211 COMPLEX E/M VISIT ADD ON: HCPCS

## 2025-04-29 PROCEDURE — 99214 OFFICE O/P EST MOD 30 MIN: CPT

## 2025-04-29 PROCEDURE — 3077F SYST BP >= 140 MM HG: CPT

## 2025-04-29 PROCEDURE — 3078F DIAST BP <80 MM HG: CPT

## 2025-04-29 NOTE — LETTER
4/29/2025    Roshan Flores MD  [facility] [address]  [facility] [facility] [address]    RE: Jayden Maciel       Dear Colleague,     I had the pleasure of seeing Jayden Maciel in the [facility].       [facility]  Cardiac Electrophysiology  [address]  [place] [address]   Office: [phone]  Fax: [phone]     HEART CARE ELECTROPHYSIOLOGY FOLLOW UP    Primary Care: Roshan Flores MD    Assessment/Recommendations     Persistent atrial fibrillation: Originally diagnosed at preop for colonoscopy.  He was asymptomatic, started on metoprolol.  Underwent successful cardioversion 11/27/2024.   No evidence or symptomatology of recurrence of atrial fibrillation. Monitors via symptoms     [id number] score of 1 for age    RANDA: sleep study scheduled for end of May     Plan:  Continue eliquis 5 mg BID for stroke prophylaxis - can stop 3 months post ablation due to low [id number] score  Taper metoprolol - take 12.5 mg daily for 1 week, then stop  Follow up 3 months post ablation or sooner if needed       History of Present Illness/Subjective    Jayden Maciel is a 69 year old male with past medical history significant for persistent atrial fibrillation and atrial flutter, RANDA, seen today for 6 week follow up of PVI + CTI ablation with Dr. Forrest 3/17/2025.     Feeling better. Did a 5k interval race 4/26, took about 45 minutes. Two races in May, and at least 10 total over the summer. Goal is mid to low 30's for [place] race in August.  Overall he feels his conditioning is improving.  He still gets occasionally short of breath while exerting himself, but does feel that this has improved.  He had some bruising, but otherwise an uneventful recovery.    He denies groin site issues, heartburn, difficulty swallowing, or neurologic changes. He denies chest discomfort, palpitations, abdominal fullness/bloating or peripheral edema,  shortness of breath, paroxysmal nocturnal dyspnea, orthopnea, lightheadedness, dizziness, pre-syncope, or syncope.     Data Review     Arrhythmia hx:  Sx: Asymptomatic, possible decreased exercise tolerance  Sx onset: Unclear  Dx/date: 10/16/2024  Rate control: Metoprolol succinate 25 mg  AAD: None  DCCV: 11/27/24 100J (AFL)  Ablation: PVI + CTI with Dr. Forrest 3/17/2025  [id number] 1 for age  OAC: Eliquis 5 mg twice daily     EKG personally reviewed.   3/17/2025: Sinus rhythm 73 bpm, QT/QTc 406/447 ms  3/11/25: Sinus rhythm 68 bpm, QT/QTc 400/425 ms  10/16/2024: Atrial fibrillation with  bpm  11/27/2024: Sinus rhythm 80 bpm     TTE 11/20/24:  1. The left ventricle is normal in size. Left ventricular systolic performance  is at the lower limits of normal. The ejection fraction is estimated to be  50%.  2. There is mild aortic valve sclerosis without significant stenosis.  3. Normal right ventricular size with mildly reduced right ventricular  systolic performance.  4. There is mild-moderate biatrial enlargement.       I have reviewed and updated the patient's past medical history, allergy list and medication list.                Physical Examination Review of Systems   BMI= There is no height or weight on file to calculate BMI.    Wt Readings from Last 3 Encounters:   03/17/25 82.6 kg (182 lb)   03/11/25 90.1 kg (198 lb 9.6 oz)   01/24/25 88.9 kg (196 lb)       Vitals: There were no vitals taken for this visit.  General   Appearance:   Alert and oriented, in no acute distress.    HEENT:  Normocephalic and atraumatic. Conjunctiva and sclera are clear. Moist oral mucosa.    Neck: No JVP, carotid bruit or obvious thyromegaly.   Lungs:   Respirations unlabored. Clear bilaterally with no rales, rhonchi, or wheezes.     Cardiovascular:   Rhythm is regular. S1 and S2 are normal. No significant murmur is present. Lower extremities demonstrate no significant edema. Posterior tibial pulses are intact bilaterally.    Extremities: No cyanosis or clubbing   Skin: Skin is warm, dry, and otherwise intact.   Neurologic: Gait not assessed. Mood and affect appropriate.     ROS: 10 point ROS neg other than the symptoms noted above in the HPI.        Medical History  Surgical History Family History Social History   No past medical history on file. Past Surgical History:   Procedure Laterality Date     EP ABLATION PULMONARY VEIN ISOLATION N/A 3/17/2025    Procedure: Ablation Atrial Fibrillation;  Surgeon: Lakeisha Forrest MD;  Location: [facility]    No family history on file. Social History     Socioeconomic History     Marital status:      Spouse name: Not on file     Number of children: Not on file     Years of education: Not on file     Highest education level: Not on file   Occupational History     Not on file   Tobacco Use     Smoking status: Never     Smokeless tobacco: Never   Substance and Sexual Activity     Alcohol use: Not on file     Drug use: Never     Sexual activity: Not on file   Other Topics Concern     Not on file   Social History Narrative     Not on file     Social Drivers of Health     Financial Resource Strain: Not on file   Food Insecurity: Not on file   Transportation Needs: Not on file   Physical Activity: Not on file   Stress: Not on file   Social Connections: Not on file   Interpersonal Safety: Low Risk  (3/17/2025)    Interpersonal Safety      Do you feel physically and emotionally safe where you currently live?: Yes      Within the past 12 months, have you been hit, slapped, kicked or otherwise physically hurt by someone?: No      Within the past 12 months, have you been humiliated or emotionally abused in other ways by your partner or ex-partner?: No   Housing Stability: Not on file          Medications  Allergies   Scheduled Meds:  Current Outpatient Medications   Medication Sig Dispense Refill     apixaban ANTICOAGULANT (ELIQUIS) 5 MG tablet Take 1 tablet (5 mg) by mouth 2 times  daily. 90 tablet 3     metoprolol succinate ER (TOPROL XL) 25 MG 24 hr tablet Take 1 tablet (25 mg) by mouth daily. 90 tablet 3     pantoprazole (PROTONIX) 40 MG EC tablet Take 1 tablet (40 mg) by mouth daily. Start 3 days prior and continue for 45 days. No refills needed 45 tablet 0    Allergies   Allergen Reactions     Penicillins Hives     Other Environmental Allergy Other (See Comments)     Hayfever Symptoms         Lab Results    Chemistry/lipid CBC Cardiac Enzymes/BNP/TSH/INR   Lab Results   Component Value Date    CHOL 164 2018    HDL 56 2018    TRIG 92 2018    BUN 14.4 2025     2025    CO2 26 2025    Lab Results   Component Value Date    WBC 5.4 2025    HGB 15.3 2025    HCT 45.2 2025    MCV 95 2025     2025    Last Comprehensive Metabolic Panel:  Lab Results   Component Value Date     2025    POTASSIUM 4.2 2025    CHLORIDE 108 (H) 2025    CO2 26 2025    ANIONGAP 8 2025     (H) 2025    BUN 14.4 2025    CR 0.88 2025    GFRESTIMATED >90 2025    LAMONTE 9.8 2025             The longitudinal plan of care for the diagnosis(es)/condition(s) as documented were addressed during this visit. Due to the added complexity in care, I will continue to support Jayden in the subsequent management and with ongoing continuity of care.     This note has been dictated using voice recognition software. Any grammatical, typographical, or context distortions are unintentional and inherent to the software.    Ishmael Ramirez PA-C  Clinical Cardiac Electrophysiology  [facility]  Clinic and [phone]  Fax: [phone]  Electrophysiology Nurses: [phone]       Thank you for allowing me to participate in the care of your patient.      Sincerely,     Derik Ramirez PA-C     [facility]  cc:    Lakeisha Forrest MD  [address] [place] [address]

## 2025-04-29 NOTE — PROGRESS NOTES
[facility]  Cardiac Electrophysiology  [address]  [place] [address]   Office: [phone]  Fax: [phone]     HEART CARE ELECTROPHYSIOLOGY FOLLOW UP    Primary Care: Roshan Flores MD    Assessment/Recommendations     Persistent atrial fibrillation: Originally diagnosed at preop for colonoscopy.  He was asymptomatic, started on metoprolol.  Underwent successful cardioversion 11/27/2024.   No evidence or symptomatology of recurrence of atrial fibrillation. Monitors via symptoms     [id number] score of 1 for age    RANDA: sleep study scheduled for end of May     Plan:  Continue eliquis 5 mg BID for stroke prophylaxis - can stop 3 months post ablation due to low [id number] score  Taper metoprolol - take 12.5 mg daily for 1 week, then stop  Follow up 3 months post ablation or sooner if needed       History of Present Illness/Subjective    Jayden Maciel is a 69 year old male with past medical history significant for persistent atrial fibrillation and atrial flutter, RANDA, seen today for 6 week follow up of PVI + CTI ablation with Dr. Forrest 3/17/2025.     Feeling better. Did a 5k interval race 4/26, took about 45 minutes. Two races in May, and at least 10 total over the summer. Goal is mid to low 30's for [place] race in August.  Overall he feels his conditioning is improving.  He still gets occasionally short of breath while exerting himself, but does feel that this has improved.  He had some bruising, but otherwise an uneventful recovery.    He denies groin site issues, heartburn, difficulty swallowing, or neurologic changes. He denies chest discomfort, palpitations, abdominal fullness/bloating or peripheral edema, shortness of breath, paroxysmal nocturnal dyspnea, orthopnea, lightheadedness, dizziness, pre-syncope, or syncope.     Data Review     Arrhythmia hx:  Sx: Asymptomatic, possible decreased exercise tolerance  Sx onset: Unclear  Dx/date:  10/16/2024  Rate control: Metoprolol succinate 25 mg  AAD: None  DCCV: 11/27/24 100J (AFL)  Ablation: PVI + CTI with Dr. Forrest 3/17/2025  [id number] 1 for age  OAC: Eliquis 5 mg twice daily     EKG personally reviewed.   3/17/2025: Sinus rhythm 73 bpm, QT/QTc 406/447 ms  3/11/25: Sinus rhythm 68 bpm, QT/QTc 400/425 ms  10/16/2024: Atrial fibrillation with  bpm  11/27/2024: Sinus rhythm 80 bpm     TTE 11/20/24:  1. The left ventricle is normal in size. Left ventricular systolic performance  is at the lower limits of normal. The ejection fraction is estimated to be  50%.  2. There is mild aortic valve sclerosis without significant stenosis.  3. Normal right ventricular size with mildly reduced right ventricular  systolic performance.  4. There is mild-moderate biatrial enlargement.       I have reviewed and updated the patient's past medical history, allergy list and medication list.                Physical Examination Review of Systems   BMI= There is no height or weight on file to calculate BMI.    Wt Readings from Last 3 Encounters:   03/17/25 82.6 kg (182 lb)   03/11/25 90.1 kg (198 lb 9.6 oz)   01/24/25 88.9 kg (196 lb)       Vitals: There were no vitals taken for this visit.  General   Appearance:   Alert and oriented, in no acute distress.    HEENT:  Normocephalic and atraumatic. Conjunctiva and sclera are clear. Moist oral mucosa.    Neck: No JVP, carotid bruit or obvious thyromegaly.   Lungs:   Respirations unlabored. Clear bilaterally with no rales, rhonchi, or wheezes.     Cardiovascular:   Rhythm is regular. S1 and S2 are normal. No significant murmur is present. Lower extremities demonstrate no significant edema. Posterior tibial pulses are intact bilaterally.   Extremities: No cyanosis or clubbing   Skin: Skin is warm, dry, and otherwise intact.   Neurologic: Gait not assessed. Mood and affect appropriate.     ROS: 10 point ROS neg other than the symptoms noted above in the HPI.         Medical History  Surgical History Family History Social History   No past medical history on file. Past Surgical History:   Procedure Laterality Date    EP ABLATION PULMONARY VEIN ISOLATION N/A 3/17/2025    Procedure: Ablation Atrial Fibrillation;  Surgeon: Lakeisha Forrest MD;  Location: [facility] CV    No family history on file. Social History     Socioeconomic History    Marital status:      Spouse name: Not on file    Number of children: Not on file    Years of education: Not on file    Highest education level: Not on file   Occupational History    Not on file   Tobacco Use    Smoking status: Never    Smokeless tobacco: Never   Substance and Sexual Activity    Alcohol use: Not on file    Drug use: Never    Sexual activity: Not on file   Other Topics Concern    Not on file   Social History Narrative    Not on file     Social Drivers of Health     Financial Resource Strain: Not on file   Food Insecurity: Not on file   Transportation Needs: Not on file   Physical Activity: Not on file   Stress: Not on file   Social Connections: Not on file   Interpersonal Safety: Low Risk  (3/17/2025)    Interpersonal Safety     Do you feel physically and emotionally safe where you currently live?: Yes     Within the past 12 months, have you been hit, slapped, kicked or otherwise physically hurt by someone?: No     Within the past 12 months, have you been humiliated or emotionally abused in other ways by your partner or ex-partner?: No   Housing Stability: Not on file          Medications  Allergies   Scheduled Meds:  Current Outpatient Medications   Medication Sig Dispense Refill    apixaban ANTICOAGULANT (ELIQUIS) 5 MG tablet Take 1 tablet (5 mg) by mouth 2 times daily. 90 tablet 3    metoprolol succinate ER (TOPROL XL) 25 MG 24 hr tablet Take 1 tablet (25 mg) by mouth daily. 90 tablet 3    pantoprazole (PROTONIX) 40 MG EC tablet Take 1 tablet (40 mg) by mouth daily. Start 3 days prior and continue for 45  days. No refills needed 45 tablet 0    Allergies   Allergen Reactions    Penicillins Hives    Other Environmental Allergy Other (See Comments)     Hayfever Symptoms         Lab Results    Chemistry/lipid CBC Cardiac Enzymes/BNP/TSH/INR   Lab Results   Component Value Date    CHOL 164 2018    HDL 56 2018    TRIG 92 2018    BUN 14.4 2025     2025    CO2 26 2025    Lab Results   Component Value Date    WBC 5.4 2025    HGB 15.3 2025    HCT 45.2 2025    MCV 95 2025     2025    Last Comprehensive Metabolic Panel:  Lab Results   Component Value Date     2025    POTASSIUM 4.2 2025    CHLORIDE 108 (H) 2025    CO2 26 2025    ANIONGAP 8 2025     (H) 2025    BUN 14.4 2025    CR 0.88 2025    GFRESTIMATED >90 2025    LAMONTE 9.8 2025             The longitudinal plan of care for the diagnosis(es)/condition(s) as documented were addressed during this visit. Due to the added complexity in care, I will continue to support Jayden in the subsequent management and with ongoing continuity of care.     This note has been dictated using voice recognition software. Any grammatical, typographical, or context distortions are unintentional and inherent to the software.    Ishmael Ramirez PA-C  Clinical Cardiac Electrophysiology  [facility]  Clinic and [phone]  Fax: [phone]  Electrophysiology Nurses: [phone]

## 2025-04-29 NOTE — PATIENT INSTRUCTIONS
Jayden Maciel,    It was a pleasure to see you today at the [facility].     My recommendations after this visit include:  - continue eliquis 5 mg twice daily - can stop after 25  - taper metoprolol - take 1/2 tab daily for one week then stop   - follow up 3 months post ablation or sooner if needed    Please do not hesitate to call with additional questions or concerns.     Ishmael Ramirez PA-C  Clinical Cardiac Electrophysiology  [facility]  Clinic and [phone]  Fax: [phone]  Electrophysiology Nurses: [phone]

## 2025-05-25 ASSESSMENT — SLEEP AND FATIGUE QUESTIONNAIRES
HOW LIKELY ARE YOU TO NOD OFF OR FALL ASLEEP WHILE SITTING AND READING: MODERATE CHANCE OF DOZING
HOW LIKELY ARE YOU TO NOD OFF OR FALL ASLEEP WHILE LYING DOWN TO REST IN THE AFTERNOON WHEN CIRCUMSTANCES PERMIT: MODERATE CHANCE OF DOZING
HOW LIKELY ARE YOU TO NOD OFF OR FALL ASLEEP WHILE SITTING QUIETLY AFTER LUNCH WITHOUT ALCOHOL: SLIGHT CHANCE OF DOZING
HOW LIKELY ARE YOU TO NOD OFF OR FALL ASLEEP WHILE SITTING AND TALKING TO SOMEONE: WOULD NEVER DOZE
HOW LIKELY ARE YOU TO NOD OFF OR FALL ASLEEP WHILE WATCHING TV: SLIGHT CHANCE OF DOZING
HOW LIKELY ARE YOU TO NOD OFF OR FALL ASLEEP IN A CAR, WHILE STOPPED FOR A FEW MINUTES IN TRAFFIC: WOULD NEVER DOZE
HOW LIKELY ARE YOU TO NOD OFF OR FALL ASLEEP WHILE SITTING INACTIVE IN A PUBLIC PLACE: WOULD NEVER DOZE
HOW LIKELY ARE YOU TO NOD OFF OR FALL ASLEEP WHEN YOU ARE A PASSENGER IN A CAR FOR AN HOUR WITHOUT A BREAK: SLIGHT CHANCE OF DOZING

## 2025-05-26 PROBLEM — G47.33 OSA (OBSTRUCTIVE SLEEP APNEA): Chronic | Status: ACTIVE | Noted: 2025-05-26

## 2025-05-26 PROBLEM — I48.19 PERSISTENT ATRIAL FIBRILLATION (H): Chronic | Status: ACTIVE | Noted: 2024-10-23

## 2025-05-26 PROBLEM — I48.19 PERSISTENT ATRIAL FIBRILLATION (H): Status: ACTIVE | Noted: 2024-10-23

## 2025-05-28 ENCOUNTER — OFFICE VISIT (OUTPATIENT)
Dept: SLEEP MEDICINE | Facility: CLINIC | Age: 70
End: 2025-05-28
Attending: INTERNAL MEDICINE
Payer: COMMERCIAL

## 2025-05-28 VITALS
HEART RATE: 72 BPM | SYSTOLIC BLOOD PRESSURE: 144 MMHG | OXYGEN SATURATION: 94 % | WEIGHT: 194 LBS | RESPIRATION RATE: 12 BRPM | BODY MASS INDEX: 29.4 KG/M2 | DIASTOLIC BLOOD PRESSURE: 75 MMHG | HEIGHT: 68 IN

## 2025-05-28 DIAGNOSIS — G47.33 OSA (OBSTRUCTIVE SLEEP APNEA): Primary | Chronic | ICD-10-CM

## 2025-05-28 DIAGNOSIS — I48.19 PERSISTENT ATRIAL FIBRILLATION (H): Chronic | ICD-10-CM

## 2025-05-28 PROCEDURE — 3077F SYST BP >= 140 MM HG: CPT | Performed by: INTERNAL MEDICINE

## 2025-05-28 PROCEDURE — 99204 OFFICE O/P NEW MOD 45 MIN: CPT | Performed by: INTERNAL MEDICINE

## 2025-05-28 PROCEDURE — 3078F DIAST BP <80 MM HG: CPT | Performed by: INTERNAL MEDICINE

## 2025-05-28 PROCEDURE — 1126F AMNT PAIN NOTED NONE PRSNT: CPT | Performed by: INTERNAL MEDICINE

## 2025-05-28 RX ORDER — FEXOFENADINE HCL 180 MG/1
180 TABLET ORAL DAILY PRN
COMMUNITY

## 2025-05-28 NOTE — NURSING NOTE
Problem: Pain  Goal: Verbalizes/displays adequate comfort level or baseline comfort level  Outcome: Progressing  Flowsheets (Taken 10/30/2024 0139)  Verbalizes/displays adequate comfort level or baseline comfort level:   Encourage patient to monitor pain and request assistance   Administer analgesics based on type and severity of pain and evaluate response   Assess pain using appropriate pain scale   Implement non-pharmacological measures as appropriate and evaluate response     Problem: Gastrointestinal - Adult  Goal: Minimal or absence of nausea and vomiting  Outcome: Progressing  Flowsheets (Taken 10/30/2024 0139)  Minimal or absence of nausea and vomiting:   Provide nonpharmacologic comfort measures as appropriate   Administer ordered antiemetic medications as needed      "Chief Complaint   Patient presents with    Sleep Problem     Patient referred for a consult for a sleep medicine consultation as he had A-Fib and an ablation. Referred by Lakeisha Forrest MD.       Initial BP (!) 144/75   Pulse 72   Resp 12   Ht 1.715 m (5' 7.5\")   Wt 88 kg (194 lb)   SpO2 94%   BMI 29.94 kg/m   Estimated body mass index is 29.94 kg/m  as calculated from the following:    Height as of this encounter: 1.715 m (5' 7.5\").    Weight as of this encounter: 88 kg (194 lb).    Medication Reconciliation: complete  ESS: 7  Neck circumference: 16.25 inches / 41 centimeters.  DME: N/A  Kizzy Sainz CMA      " (0) blue, pale

## 2025-05-28 NOTE — PROGRESS NOTES
Outpatient Sleep Medicine Consultation:      Name: Jayden Maciel MRN# [id number]   Age: 69 year old YOB: 1955     Date of Consultation: May 28, 2025  Consultation is requested by: Lakeisha Forrest MD  [address] [place] [id number] Lakeisha Forrest  Primary care provider: Roshan Flores       Reason for Sleep Consult:     Jayden Maciel is sent by Lakeisha Forrest for a sleep consultation regarding atrial fibrillation .    Patient s Reason for visit  Jayden Maciel main reason for visit: (Patient-Rptd) referral by doctor after a-fib diagnosis, previous sleep study found mild apnea  Patient states problem(s) started: (Patient-Rptd) goes back several years. previous sleep study done in Dec. 2016 (?)  Jayden Maciel's goals for this visit: (Patient-Rptd) determine if I need to take any action           Assessment and Plan:     Mild obstructive sleep apnea by sleep study 2014  25-30 # weight gain since then   Snoring, nocturia, minimal symptoms.   Comorbid atrial fibrillation  Declines further evaluation at this time, consider polysomnogram for re-evaluation    Obesity  We discussed the link between obesity, sleep apnea   - See patient instructions        Summary Counseling:    Caffeine use  Sleep Testing Reviewed  Obstructive Sleep Apnea Reviewed  Complications of Untreated Sleep Apnea Reviewed    I spent 15 minutes with patient including counseling, and 15+5 minutes with chart review, and documentation     CC: Lakeisha Forrest          History of Present Illness:     Patient was seen y [facility] at [facility] in 2014 for loud snoring, Excessive daytime sleepiness,/non-restorative sleep (ESS 14), sleep maintenance difficulties/termiinal insomnia.    Polysomnogram (PSG) at [facility] on 1/8/15 (165 lb)  - AHI of 12 REM AHI is 22. Respiratory disturbance index (RDI) 12.   - Low O2 88%    Treatment options were discussed...     He sleeps in a recliner in the living  room due to back and neck pain     SLEEP-WAKE SCHEDULE:     Work/School Days: Patient goes to school/work: (Patient-Rptd) Yes   Usually gets into bed at (Patient-Rptd) 11 PM  Takes patient about (Patient-Rptd) usually only 15 to 20  minutes to fall asleep  Has trouble falling asleep (Patient-Rptd) 1 or 2 nights per week  Wakes up in the middle of the night (Patient-Rptd) 1 or 2 times.  Wakes up due to (Patient-Rptd) Snorting self awake;Use the bathroom  He has trouble falling back asleep (Patient-Rptd) 1 or 2 times a week.   It usually takes (Patient-Rptd) 20 minutes to get back to sleep  Patient is usually up at (Patient-Rptd) 7:00 to 7:30 am  Uses alarm: (Patient-Rptd) No    Weekends/Non-work Days/All Other Days:  Usually gets into bed at (Patient-Rptd) 9:30 pm   Takes patient about (Patient-Rptd) 20 minutes to fall asleep  Patient is usually up at (Patient-Rptd) 5:00 AM  Uses alarm: (Patient-Rptd) Yes    Sleep Need  Patient gets  (Patient-Rptd) 7 1/2 hrs sleep on average   Patient thinks he needs about (Patient-Rptd) 7 to 7 1/2 hrs sleep    Jayden J Jimbodeidre prefers to sleep in this position(s): (Patient-Rptd) Back;Side;Head Elevated;Recliner   Patient states they do the following activities in bed: (Patient-Rptd) Read    Naps  Patient takes a purposeful nap (Patient-Rptd) 3 or 4 times a week and naps are usually (Patient-Rptd) 30 to 45 minutes in duration  He feels better after a nap: (Patient-Rptd) Yes  He dozes off unintentionally (Patient-Rptd) 1 or 2 days per week  Patient has had a driving accident or near-miss due to sleepiness/drowsiness: (Patient-Rptd) No      SLEEP DISRUPTIONS:    Breathing/Snoring  Patient snores:(Patient-Rptd) Yes (sleeps in living room)  Other people complain about his snoring: (Patient-Rptd) No  Patient has been told he stops breathing in his sleep:(Patient-Rptd) No  He has issues with the following: (Patient-Rptd) Morning mouth dryness;Stuffy nose when you wake  up    Movement:  Patient gets pain, discomfort, with an urge to move:  (Patient-Rptd) Yes  After exercise (mostly in summer) 2/month  It happens when he is resting:  (Patient-Rptd) Yes  It happens more at night:  (Patient-Rptd) Yes  Patient has been told he kicks his legs at night:  (Patient-Rptd) No     Behaviors in Sleep:  Jayden Maciel has experienced the following behaviors while sleeping: (Patient-Rptd) Teeth grinding;Waking up paralyzed  He has experienced sudden muscle weakness during the day: (Patient-Rptd) No      Is there anything else you would like your sleep provider to know: (Patient-Rptd) exercising in the evening can cause restless legs at night making it difficult to get to sleep.      CAFFEINE AND OTHER SUBSTANCES:    Patient consumes caffeinated beverages per day:  (Patient-Rptd) 3x  Last caffeine use is usually: (Patient-Rptd) 3pm to 4 pm   List of any prescribed or over the counter stimulants that patient takes: (Patient-Rptd) none  List of any prescribed or over the counter sleep medication patient takes: (Patient-Rptd) none  List of previous sleep medications that patient has tried: (Patient-Rptd) none  Patient drinks alcohol to help them sleep: (Patient-Rptd) No  Patient drinks alcohol near bedtime: (Patient-Rptd) No    Family History:  Patient has a family member been diagnosed with a sleep disorder: (Patient-Rptd) Yes  (Patient-Rptd) my late father         SCALES:    EPWORTH SLEEPINESS SCALE         5/28/2025    10:00 AM    [place] Sleepiness Scale ( SEAN Jessica  [id number]<br>ESS - [place]/English - Final version - 21 Nov 07 - [facility] [place].)   [place] Score (Sleep) 7         INSOMNIA SEVERITY INDEX (VINCENZO)          5/28/2025    10:00 AM   Insomnia Severity Index (VINCENZO)   Difficulty falling asleep 0   Difficulty staying asleep 1   Problems waking up too early 1   How SATISFIED/DISSATISFIED are you with your CURRENT sleep pattern? 1   How NOTICEABLE to others do you think your sleep  problem is in terms of impairing the quality of your life? 0   How WORRIED/DISTRESSED are you about your current sleep problem? 0   To what extent do you consider your sleep problem to INTERFERE with your daily functioning (e.g. daytime fatigue, mood, ability to function at work/daily chores, concentration, memory, mood, etc.) CURRENTLY? 1   VINCENZO Total Score 4       Guidelines for Scoring/Interpretation:  Total score categories:  0-7 = No clinically significant insomnia   8-14 = Subthreshold insomnia   15-21 = Clinical insomnia (moderate severity)  22-28 = Clinical insomnia (severe)  Used via courtesy of www.Giveyealth.va.gov with permission from Rio Brasher PhD., [facility]             Allergies:    Allergies   Allergen Reactions    Penicillins Hives    Other Environmental Allergy Other (See Comments)     Hayfever Symptoms       Medications:    Current Outpatient Medications   Medication Sig Dispense Refill    apixaban ANTICOAGULANT (ELIQUIS) 5 MG tablet Take 1 tablet (5 mg) by mouth 2 times daily. 90 tablet 3    fexofenadine (ALLEGRA) 180 MG tablet Take 180 mg by mouth daily as needed for allergies.         Problem List:  Patient Active Problem List    Diagnosis Date Noted    RANDA (obstructive sleep apnea) - mild (AHI 12) 05/26/2025     Priority: Medium     Polysomnogram (PSG) at [facility] on 1/8/15 (165 lb)  - AHI of 12 REM AHI is 22. Respiratory disturbance index (RDI) 12.       Persistent atrial fibrillation (H) 10/23/2024     Priority: Medium     Originally diagnosed at preop for colonoscopy 10/2024.  He was asymptomatic, started on metoprolol.  Underwent successful cardioversion 11/27/2024.  Of note, he was in atrial flutter prior to cardioversion. S/p ablation 3/2025          Past Medical/Surgical History:  Past Medical History:   Diagnosis Date    Atrial fibrillation with rapid ventricular response (H) 03/17/2025     Past Surgical History:   Procedure Laterality Date    EP ABLATION  PULMONARY VEIN ISOLATION N/A 3/17/2025    Procedure: Ablation Atrial Fibrillation;  Surgeon: Lakeisha Forrest MD;  Location: [facility]       Social History:  Social History     Socioeconomic History    Marital status:      Spouse name: Not on file    Number of children: Not on file    Years of education: Not on file    Highest education level: Not on file   Occupational History    Not on file   Tobacco Use    Smoking status: Never     Passive exposure: Never    Smokeless tobacco: Never   Vaping Use    Vaping status: Never Used   Substance and Sexual Activity    Alcohol use: Not on file    Drug use: Never    Sexual activity: Not on file   Other Topics Concern    Not on file   Social History Narrative    Not on file     Social Drivers of Health     Financial Resource Strain: Not on file   Food Insecurity: Not on file   Transportation Needs: Not on file   Physical Activity: Not on file   Stress: Not on file   Social Connections: Not on file   Interpersonal Safety: Low Risk  (3/17/2025)    Interpersonal Safety     Do you feel physically and emotionally safe where you currently live?: Yes     Within the past 12 months, have you been hit, slapped, kicked or otherwise physically hurt by someone?: No     Within the past 12 months, have you been humiliated or emotionally abused in other ways by your partner or ex-partner?: No   Housing Stability: Not on file       Family History:  No family history on file.    Review of Systems:  A complete review of systems reviewed by me is negative with the exeption of what has been mentioned in the history of present illness.  In the last TWO WEEKS have you experienced any of the following symptoms?  Fevers: (Patient-Rptd) No  Night Sweats: (Patient-Rptd) No  Weight Gain: (Patient-Rptd) No  Pain at Night: (Patient-Rptd) No  Double Vision: (Patient-Rptd) No  Changes in Vision: (Patient-Rptd) No  Difficulty Breathing through Nose: (Patient-Rptd) Yes  Sore Throat in  "Morning: (Patient-Rptd) No  Dry Mouth in the Morning: (Patient-Rptd) Yes  Shortness of Breath Lying Flat: (Patient-Rptd) No  Shortness of Breath With Activity: (Patient-Rptd) Yes  Awakening with Shortness of Breath: (Patient-Rptd) No  Increased Cough: (Patient-Rptd) No  Heart Racing at Night: (Patient-Rptd) No  Swelling in Feet or Legs: (Patient-Rptd) No  Diarrhea at Night: (Patient-Rptd) No  Heartburn at Night: (Patient-Rptd) No  Urinating More than Once at Night: (Patient-Rptd) Yes  Losing Control of Urine at Night: (Patient-Rptd) No  Joint Pains at Night: (Patient-Rptd) No  Headaches in Morning: (Patient-Rptd) No  Weakness in Arms or Legs: (Patient-Rptd) No  Depressed Mood: (Patient-Rptd) No  Anxiety: (Patient-Rptd) No         Physical Examination:  Vitals: BP (!) 144/75   Pulse 72   Resp 12   Ht 1.715 m (5' 7.5\")   Wt 88 kg (194 lb)   SpO2 94%   BMI 29.94 kg/m    BMI= Body mass index is 29.94 kg/m .    Neck Cir (cm): 41 cm    SpO2 Readings from Last 4 Encounters:   05/28/25 94%   03/17/25 97%   11/27/24 98%   10/16/24 97%       GENERAL APPEARANCE: alert and no distress  EYES: Eyes grossly normal to inspection  LUNGS: no shortness of breath , cough  NEURO: mentation intact, speech normal and cranial nerves 2-12 appear intact  PSYCH: affect normal/bright           Data: All pertinent previous laboratory data reviewed     Recent Labs   Lab Test 03/11/25  0914 10/16/24  1332    142   POTASSIUM 4.2 4.8   CHLORIDE 108* 106   CO2 26 26   ANIONGAP 8 10   * 92   BUN 14.4 5.5*   CR 0.88 0.84   LAMONTE 9.8 9.6       Recent Labs   Lab Test 03/11/25  0914   WBC 5.4   RBC 4.78   HGB 15.3   HCT 45.2   MCV 95   MCH 32.0   MCHC 33.8   RDW 12.4          Recent Labs   Lab Test 10/16/24  1332   PROTTOTAL 7.9   ALBUMIN 4.5   BILITOTAL 0.7   ALKPHOS 143   AST 30   ALT 28       TSH (uIU/mL)   Date Value   10/16/2024 1.35       Chest x-ray:   Chest XR,  PA & LAT 10/16/2024    Narrative  EXAM: XR CHEST 2 " VIEWS  LOCATION: [facility]  DATE: 10/16/2024    INDICATION: Atrial fibrillation incidentally found  COMPARISON: 3/27/2023    Impression  IMPRESSION: A strand of fibrosis and/or linear atelectasis in each lung base is stable. Lungs are otherwise clear. No pleural effusion. Heart size and pulmonary vascularity within normal limits.           Simón Corrales MD 5/28/2025

## 2025-07-14 ENCOUNTER — OFFICE VISIT (OUTPATIENT)
Dept: CARDIOLOGY | Facility: CLINIC | Age: 70
End: 2025-07-14
Payer: COMMERCIAL

## 2025-07-14 VITALS
RESPIRATION RATE: 17 BRPM | HEIGHT: 68 IN | HEART RATE: 56 BPM | WEIGHT: 186 LBS | DIASTOLIC BLOOD PRESSURE: 74 MMHG | BODY MASS INDEX: 28.19 KG/M2 | SYSTOLIC BLOOD PRESSURE: 128 MMHG

## 2025-07-14 DIAGNOSIS — I48.19 PERSISTENT ATRIAL FIBRILLATION (H): ICD-10-CM

## 2025-07-14 PROCEDURE — 99214 OFFICE O/P EST MOD 30 MIN: CPT

## 2025-07-14 PROCEDURE — G2211 COMPLEX E/M VISIT ADD ON: HCPCS

## 2025-07-14 PROCEDURE — 3078F DIAST BP <80 MM HG: CPT

## 2025-07-14 PROCEDURE — 3074F SYST BP LT 130 MM HG: CPT

## 2025-07-14 NOTE — PROGRESS NOTES
[facility]  Cardiac Electrophysiology  [address]  [place] [address]   Office: [phone]  Fax: [phone]     HEART CARE ELECTROPHYSIOLOGY FOLLOW UP    Primary Care: Roshan Flores MD    Assessment/Recommendations     perisstent atrial fibrillation: Status post PVI + CTI with Dr. Forrest 3/17/25.  Recent diagnosis preop for colonoscopy.  He was asymptomatic at that time, was started on metoprolol.  Underwent successful cardioversion 11/27/2024 for atrial flutter at that time.  Possibly symptomatic with decreased exercise tolerance.    No evidence or symptomatology of recurrence of atrial fibrillation. Monitors with via symptoms.     [id number] score of 1 for age    RANDA: found mild apnea on sleep study in May 2025    Plan:  Not on Eliquis due to low [id number] score, stopped 3 months post ablation  No other medications  Continue monitoring for recurrence of atrial fibrillation, continue increasing activity as tolerated  Follow-up 1 year post ablation       History of Present Illness/Subjective    Jayden Maciel is a 69 year old male with past medical history significant for persistent atrial fibrillation and atrial flutter, RANDA, seen today for 3-month follow-up with PVI + CTI ablation with Dr. Forrest 3/17/2025.    He was scheduled for colonoscopy in October 2024 when he was found to be in atrial fibrillation during his preoperative screening.  He also has been a lifelong runner who contracted COVID-pneumonia in 2022 and notices significant decline in his activity tolerance after that. Developed recurrent COVID in August 2024 and also noticed decreased exercise tolerance. He was then diagnosed with afib a short time later. Underwent cardioversion 11/27/24 before ultimately undergoing ablation 3/17/25.     July 4th, 4 mile race in [place], was able to run for the first 3 miles, only stopped during water break. Feels as though exercise tolerance is  improving. Race in [place] in August and is aiming for sub-40 minutes. Over a dozen races scheduled through the fall. Down 5 lbs, would like to lose another 10 pounds or so.  He is otherwise feeling well.    He denies groin site issues, heartburn, difficulty swallowing, or neurologic changes. He denies chest discomfort, palpitations, abdominal fullness/bloating or peripheral edema, shortness of breath, paroxysmal nocturnal dyspnea, orthopnea, lightheadedness, dizziness, pre-syncope, or syncope.     Data Review     Arrhythmia hx:  Sx: Asymptomatic, possible decreased exercise tolerance  Sx onset: Unclear  Dx/date: 10/16/2024  Rate control: previously on metoprolol   AAD: None  DCCV: 11/27/24 100J (AFL)  Ablation: PVI + CTI with Dr. Forrest 3/17/2025  [id number] 1 for age  OAC: Eliquis 5 mg twice daily (stopped 3 months post ablation)     EKG personally reviewed.   3/17/2025: Sinus rhythm 73 bpm, QT/QTc 406/447 ms  3/11/25: Sinus rhythm 68 bpm, QT/QTc 400/425 ms  10/16/2024: Atrial fibrillation with  bpm  11/27/2024: Sinus rhythm 80 bpm     TTE 11/20/24:  1. The left ventricle is normal in size. Left ventricular systolic performance  is at the lower limits of normal. The ejection fraction is estimated to be  50%.  2. There is mild aortic valve sclerosis without significant stenosis.  3. Normal right ventricular size with mildly reduced right ventricular  systolic performance.  4. There is mild-moderate biatrial enlargement.    I have reviewed and updated the patient's past medical history, allergy list and medication list.                Physical Examination Review of Systems   BMI= There is no height or weight on file to calculate BMI.    Wt Readings from Last 3 Encounters:   05/28/25 88 kg (194 lb)   04/29/25 88.9 kg (196 lb)   03/17/25 82.6 kg (182 lb)       Vitals: There were no vitals taken for this visit.  General   Appearance:   Alert and oriented, in no acute distress.    HEENT:  Normocephalic  and atraumatic. Conjunctiva and sclera are clear. Moist oral mucosa.    Neck: No JVP, carotid bruit or obvious thyromegaly.   Lungs:   Respirations unlabored. Clear bilaterally with no rales, rhonchi, or wheezes.     Cardiovascular:   Rhythm is regular. S1 and S2 are normal. No significant murmur is present. Lower extremities demonstrate no significant edema. Posterior tibial pulses are intact bilaterally.   Extremities: No cyanosis or clubbing   Skin: Skin is warm, dry, and otherwise intact.   Neurologic: Gait not assessed. Mood and affect appropriate.     ROS: 10 point ROS neg other than the symptoms noted above in the HPI.        Medical History  Surgical History Family History Social History   Past Medical History:   Diagnosis Date    Atrial fibrillation with rapid ventricular response (H) 03/17/2025    Past Surgical History:   Procedure Laterality Date    EP ABLATION PULMONARY VEIN ISOLATION N/A 3/17/2025    Procedure: Ablation Atrial Fibrillation;  Surgeon: Lakeisha Forrest MD;  Location: [facility]    No family history on file. Social History     Socioeconomic History    Marital status:      Spouse name: Not on file    Number of children: Not on file    Years of education: Not on file    Highest education level: Not on file   Occupational History    Not on file   Tobacco Use    Smoking status: Never     Passive exposure: Never    Smokeless tobacco: Never   Vaping Use    Vaping status: Never Used   Substance and Sexual Activity    Alcohol use: Not on file    Drug use: Never    Sexual activity: Not on file   Other Topics Concern    Not on file   Social History Narrative    Not on file     Social Drivers of Health     Financial Resource Strain: Not on file   Food Insecurity: Not on file   Transportation Needs: Not on file   Physical Activity: Not on file   Stress: Not on file   Social Connections: Not on file   Interpersonal Safety: Low Risk  (3/17/2025)    Interpersonal Safety     Do you  feel physically and emotionally safe where you currently live?: Yes     Within the past 12 months, have you been hit, slapped, kicked or otherwise physically hurt by someone?: No     Within the past 12 months, have you been humiliated or emotionally abused in other ways by your partner or ex-partner?: No   Housing Stability: Not on file          Medications  Allergies   Scheduled Meds:  Current Outpatient Medications   Medication Sig Dispense Refill    apixaban ANTICOAGULANT (ELIQUIS) 5 MG tablet Take 1 tablet (5 mg) by mouth 2 times daily. 90 tablet 3    fexofenadine (ALLEGRA) 180 MG tablet Take 180 mg by mouth daily as needed for allergies.      Allergies   Allergen Reactions    Penicillins Hives    Other Environmental Allergy Other (See Comments)     Hayfever Symptoms         Lab Results    Chemistry/lipid CBC Cardiac Enzymes/BNP/TSH/INR   Lab Results   Component Value Date    CHOL 164 02/20/2018    HDL 56 02/20/2018    TRIG 92 02/20/2018    BUN 14.4 03/11/2025     03/11/2025    CO2 26 03/11/2025    Lab Results   Component Value Date    WBC 5.4 03/11/2025    HGB 15.3 03/11/2025    HCT 45.2 03/11/2025    MCV 95 03/11/2025     03/11/2025    Last Comprehensive Metabolic Panel:  Lab Results   Component Value Date     03/11/2025    POTASSIUM 4.2 03/11/2025    CHLORIDE 108 (H) 03/11/2025    CO2 26 03/11/2025    ANIONGAP 8 03/11/2025     (H) 03/11/2025    BUN 14.4 03/11/2025    CR 0.88 03/11/2025    GFRESTIMATED >90 03/11/2025    LAMONTE 9.8 03/11/2025             30 minutes spent reviewing prior records (including documentation, laboratory studies, cardiac testing/imaging), history and physical exam, planning, and subsequent documentation.     The longitudinal plan of care for the diagnosis(es)/condition(s) as documented were addressed during this visit. Due to the added complexity in care, I will continue to support Jayden in the subsequent management and with ongoing continuity of care.     This  note has been dictated using voice recognition software. Any grammatical, typographical, or context distortions are unintentional and inherent to the software.    Ishmael Ramirez PA-C  Clinical Cardiac Electrophysiology  [facility]  Clinic and [phone]  Fax: [phone]  Electrophysiology Nurses: [phone]

## 2025-07-14 NOTE — LETTER
7/14/2025    Roshan Flores MD  [facility] [address]  [facility] [address]    RE: Jayden Maciel       Dear Colleague,     I had the pleasure of seeing Jayden Maciel in the [facility].       [facility]  Cardiac Electrophysiology  [address]  [place] [address]   Office: [phone]  Fax: [phone]     HEART CARE ELECTROPHYSIOLOGY FOLLOW UP    Primary Care: Roshan Flores MD    Assessment/Recommendations     perisstent atrial fibrillation: Status post PVI + CTI with Dr. Forrest 3/17/25.  Recent diagnosis preop for colonoscopy.  He was asymptomatic at that time, was started on metoprolol.  Underwent successful cardioversion 11/27/2024 for atrial flutter at that time.  Possibly symptomatic with decreased exercise tolerance.    No evidence or symptomatology of recurrence of atrial fibrillation. Monitors with via symptoms.     [id number] score of 1 for age    RANDA: found mild apnea on sleep study in May 2025    Plan:  Not on Eliquis due to low [id number] score, stopped 3 months post ablation  No other medications  Continue monitoring for recurrence of atrial fibrillation, continue increasing activity as tolerated  Follow-up 1 year post ablation       History of Present Illness/Subjective    Jayden Maciel is a 69 year old male with past medical history significant for persistent atrial fibrillation and atrial flutter, RANDA, seen today for 3-month follow-up with PVI + CTI ablation with Dr. Forrest 3/17/2025.    He was scheduled for colonoscopy in October 2024 when he was found to be in atrial fibrillation during his preoperative screening.  He also has been a lifelong runner who contracted COVID-pneumonia in 2022 and notices significant decline in his activity tolerance after that. Developed recurrent COVID in August 2024 and also noticed decreased exercise tolerance. He was then diagnosed with afib a short  time later. Underwent cardioversion 11/27/24 before ultimately undergoing ablation 3/17/25.     July 4th, 4 mile race in [place], was able to run for the first 3 miles, only stopped during water break. Feels as though exercise tolerance is improving. Race in [place] in August and is aiming for sub-40 minutes. Over a dozen races scheduled through the fall. Down 5 lbs, would like to lose another 10 pounds or so.  He is otherwise feeling well.    He denies groin site issues, heartburn, difficulty swallowing, or neurologic changes. He denies chest discomfort, palpitations, abdominal fullness/bloating or peripheral edema, shortness of breath, paroxysmal nocturnal dyspnea, orthopnea, lightheadedness, dizziness, pre-syncope, or syncope.     Data Review     Arrhythmia hx:  Sx: Asymptomatic, possible decreased exercise tolerance  Sx onset: Unclear  Dx/date: 10/16/2024  Rate control: previously on metoprolol   AAD: None  DCCV: 11/27/24 100J (AFL)  Ablation: PVI + CTI with Dr. Forrest 3/17/2025  [id number] 1 for age  OAC: Eliquis 5 mg twice daily (stopped 3 months post ablation)     EKG personally reviewed.   3/17/2025: Sinus rhythm 73 bpm, QT/QTc 406/447 ms  3/11/25: Sinus rhythm 68 bpm, QT/QTc 400/425 ms  10/16/2024: Atrial fibrillation with  bpm  11/27/2024: Sinus rhythm 80 bpm     TTE 11/20/24:  1. The left ventricle is normal in size. Left ventricular systolic performance  is at the lower limits of normal. The ejection fraction is estimated to be  50%.  2. There is mild aortic valve sclerosis without significant stenosis.  3. Normal right ventricular size with mildly reduced right ventricular  systolic performance.  4. There is mild-moderate biatrial enlargement.    I have reviewed and updated the patient's past medical history, allergy list and medication list.                Physical Examination Review of Systems   BMI= There is no height or weight on file to calculate BMI.    Wt Readings from Last 3  Encounters:   05/28/25 88 kg (194 lb)   04/29/25 88.9 kg (196 lb)   03/17/25 82.6 kg (182 lb)       Vitals: There were no vitals taken for this visit.  General   Appearance:   Alert and oriented, in no acute distress.    HEENT:  Normocephalic and atraumatic. Conjunctiva and sclera are clear. Moist oral mucosa.    Neck: No JVP, carotid bruit or obvious thyromegaly.   Lungs:   Respirations unlabored. Clear bilaterally with no rales, rhonchi, or wheezes.     Cardiovascular:   Rhythm is regular. S1 and S2 are normal. No significant murmur is present. Lower extremities demonstrate no significant edema. Posterior tibial pulses are intact bilaterally.   Extremities: No cyanosis or clubbing   Skin: Skin is warm, dry, and otherwise intact.   Neurologic: Gait not assessed. Mood and affect appropriate.     ROS: 10 point ROS neg other than the symptoms noted above in the HPI.        Medical History  Surgical History Family History Social History   Past Medical History:   Diagnosis Date     Atrial fibrillation with rapid ventricular response (H) 03/17/2025    Past Surgical History:   Procedure Laterality Date     EP ABLATION PULMONARY VEIN ISOLATION N/A 3/17/2025    Procedure: Ablation Atrial Fibrillation;  Surgeon: Lakeisha Forrest MD;  Location: [facility]    No family history on file. Social History     Socioeconomic History     Marital status:      Spouse name: Not on file     Number of children: Not on file     Years of education: Not on file     Highest education level: Not on file   Occupational History     Not on file   Tobacco Use     Smoking status: Never     Passive exposure: Never     Smokeless tobacco: Never   Vaping Use     Vaping status: Never Used   Substance and Sexual Activity     Alcohol use: Not on file     Drug use: Never     Sexual activity: Not on file   Other Topics Concern     Not on file   Social History Narrative     Not on file     Social Drivers of Health     Financial Resource  Strain: Not on file   Food Insecurity: Not on file   Transportation Needs: Not on file   Physical Activity: Not on file   Stress: Not on file   Social Connections: Not on file   Interpersonal Safety: Low Risk  (3/17/2025)    Interpersonal Safety      Do you feel physically and emotionally safe where you currently live?: Yes      Within the past 12 months, have you been hit, slapped, kicked or otherwise physically hurt by someone?: No      Within the past 12 months, have you been humiliated or emotionally abused in other ways by your partner or ex-partner?: No   Housing Stability: Not on file          Medications  Allergies   Scheduled Meds:  Current Outpatient Medications   Medication Sig Dispense Refill     apixaban ANTICOAGULANT (ELIQUIS) 5 MG tablet Take 1 tablet (5 mg) by mouth 2 times daily. 90 tablet 3     fexofenadine (ALLEGRA) 180 MG tablet Take 180 mg by mouth daily as needed for allergies.      Allergies   Allergen Reactions     Penicillins Hives     Other Environmental Allergy Other (See Comments)     Hayfever Symptoms         Lab Results    Chemistry/lipid CBC Cardiac Enzymes/BNP/TSH/INR   Lab Results   Component Value Date    CHOL 164 02/20/2018    HDL 56 02/20/2018    TRIG 92 02/20/2018    BUN 14.4 03/11/2025     03/11/2025    CO2 26 03/11/2025    Lab Results   Component Value Date    WBC 5.4 03/11/2025    HGB 15.3 03/11/2025    HCT 45.2 03/11/2025    MCV 95 03/11/2025     03/11/2025    Last Comprehensive Metabolic Panel:  Lab Results   Component Value Date     03/11/2025    POTASSIUM 4.2 03/11/2025    CHLORIDE 108 (H) 03/11/2025    CO2 26 03/11/2025    ANIONGAP 8 03/11/2025     (H) 03/11/2025    BUN 14.4 03/11/2025    CR 0.88 03/11/2025    GFRESTIMATED >90 03/11/2025    LAMONTE 9.8 03/11/2025             30 minutes spent reviewing prior records (including documentation, laboratory studies, cardiac testing/imaging), history and physical exam, planning, and subsequent  documentation.     The longitudinal plan of care for the diagnosis(es)/condition(s) as documented were addressed during this visit. Due to the added complexity in care, I will continue to support Jayden in the subsequent management and with ongoing continuity of care.     This note has been dictated using voice recognition software. Any grammatical, typographical, or context distortions are unintentional and inherent to the software.    Ishmael Ramirez PA-C  Clinical Cardiac Electrophysiology  [facility]  Clinic and [phone]  Fax: [phone]  Electrophysiology Nurses: [phone]       Thank you for allowing me to participate in the care of your patient.      Sincerely,     Derik Ramirez PA-C     [facility]  cc:   Derik Ramirez PA-C  [address]  [place] [address]

## 2025-07-14 NOTE — PATIENT INSTRUCTIONS
Jayden Maciel,    It was a pleasure to see you today at the [facility].     My recommendations after this visit include:  - no changes today  - continue running your races!   - follow up 1 year post ablation (3/2026), or sooner if needed for recurrence of afib.     Please do not hesitate to call with additional questions or concerns.     Ishmael Ramirez PA-C  Clinical Cardiac Electrophysiology  [facility]  Clinic and [phone]  Fax: [phone]  Electrophysiology Nurses: [phone]

## (undated) DEVICE — CATHETER OCTARAY LONG SPLINE CURVE F 3-3-3-3-3 D160906

## (undated) DEVICE — INTRO MICRO MINI STICK 4FR STIFF NITINOL 45-753

## (undated) DEVICE — INTRODUCER SHEATH VASC CATH 8.5FR CARTO GIDE STH MED D138502

## (undated) DEVICE — CATH EP 7FR X 115CM DECANAV CA

## (undated) DEVICE — 8F SOUNDSTAR ECO ULTRASOUND CATHETER

## (undated) DEVICE — SHEATH PINNACLE 9FR 10CM W/MARKER

## (undated) DEVICE — CATH ABLATION 8FR 115CM D-F CURVE BI-DIR QDOT MICRO D139505

## (undated) DEVICE — INTRO TERUMO 8FRX25CM W/MARKER RSB803

## (undated) DEVICE — PROBE TEMP CIRCA S-CATH M ESPH 12-SNSR 3D MAP CS-21EP

## (undated) DEVICE — GUIDEWIRE JTIP 3MM .035 180CM IQ35F180J3

## (undated) DEVICE — TUBE SET SMARKABLATE IRRIGATION

## (undated) DEVICE — CUSTOM PACK EP

## (undated) DEVICE — PATCH CARTO 3 EXTERNAL REFERENCE 3D MAPPING CREFP6

## (undated) DEVICE — CATH TRANSSEPTAL VERSACROSS 8.5FR 180-J RF 63CM 45DEG

## (undated) DEVICE — TRANSDUCER TRAY ARTERIAL 42646-06

## (undated) RX ORDER — FENTANYL CITRATE-0.9 % NACL/PF 10 MCG/ML
PLASTIC BAG, INJECTION (ML) INTRAVENOUS
Status: DISPENSED
Start: 2025-03-17

## (undated) RX ORDER — PROPOFOL 10 MG/ML
INJECTION, EMULSION INTRAVENOUS
Status: DISPENSED
Start: 2025-03-17

## (undated) RX ORDER — PHENYLEPHRINE HYDROCHLORIDE 10 MG/ML
INJECTION INTRAVENOUS
Status: DISPENSED
Start: 2025-03-17

## (undated) RX ORDER — PROTAMINE SULFATE 10 MG/ML
INJECTION, SOLUTION INTRAVENOUS
Status: DISPENSED
Start: 2025-03-17

## (undated) RX ORDER — FENTANYL CITRATE 50 UG/ML
INJECTION, SOLUTION INTRAMUSCULAR; INTRAVENOUS
Status: DISPENSED
Start: 2025-03-17

## (undated) RX ORDER — VASOPRESSIN 20 [USP'U]/ML
INJECTION, SOLUTION INTRAVENOUS
Status: DISPENSED
Start: 2025-03-17